# Patient Record
Sex: FEMALE | Race: WHITE | NOT HISPANIC OR LATINO | Employment: PART TIME | URBAN - METROPOLITAN AREA
[De-identification: names, ages, dates, MRNs, and addresses within clinical notes are randomized per-mention and may not be internally consistent; named-entity substitution may affect disease eponyms.]

---

## 2019-04-11 ENCOUNTER — OFFICE VISIT (OUTPATIENT)
Dept: URGENT CARE | Facility: CLINIC | Age: 54
End: 2019-04-11
Payer: COMMERCIAL

## 2019-04-11 VITALS
HEIGHT: 62 IN | OXYGEN SATURATION: 97 % | SYSTOLIC BLOOD PRESSURE: 120 MMHG | WEIGHT: 150 LBS | RESPIRATION RATE: 18 BRPM | TEMPERATURE: 98.2 F | HEART RATE: 73 BPM | DIASTOLIC BLOOD PRESSURE: 80 MMHG | BODY MASS INDEX: 27.6 KG/M2

## 2019-04-11 DIAGNOSIS — J02.9 SORE THROAT: ICD-10-CM

## 2019-04-11 DIAGNOSIS — H65.91 RIGHT NON-SUPPURATIVE OTITIS MEDIA: Primary | ICD-10-CM

## 2019-04-11 LAB — S PYO AG THROAT QL: NEGATIVE

## 2019-04-11 PROCEDURE — 99213 OFFICE O/P EST LOW 20 MIN: CPT | Performed by: FAMILY MEDICINE

## 2019-04-11 RX ORDER — BENZONATATE 100 MG/1
100 CAPSULE ORAL 3 TIMES DAILY PRN
Qty: 20 CAPSULE | Refills: 0 | Status: SHIPPED | OUTPATIENT
Start: 2019-04-11 | End: 2019-12-09

## 2019-04-11 RX ORDER — CLARITHROMYCIN 500 MG/1
500 TABLET, COATED ORAL EVERY 12 HOURS SCHEDULED
Qty: 20 TABLET | Refills: 0 | Status: SHIPPED | OUTPATIENT
Start: 2019-04-11 | End: 2019-04-21

## 2019-12-02 ENCOUNTER — APPOINTMENT (EMERGENCY)
Dept: RADIOLOGY | Facility: HOSPITAL | Age: 54
End: 2019-12-02
Payer: COMMERCIAL

## 2019-12-02 ENCOUNTER — HOSPITAL ENCOUNTER (OUTPATIENT)
Facility: HOSPITAL | Age: 54
Setting detail: OBSERVATION
Discharge: HOME | End: 2019-12-02
Attending: EMERGENCY MEDICINE | Admitting: INTERNAL MEDICINE
Payer: COMMERCIAL

## 2019-12-02 ENCOUNTER — APPOINTMENT (OUTPATIENT)
Dept: MRI IMAGING | Facility: HOSPITAL | Age: 54
End: 2019-12-02
Payer: COMMERCIAL

## 2019-12-02 ENCOUNTER — APPOINTMENT (OUTPATIENT)
Dept: NON INVASIVE DIAGNOSTICS | Facility: HOSPITAL | Age: 54
End: 2019-12-02
Payer: COMMERCIAL

## 2019-12-02 ENCOUNTER — APPOINTMENT (EMERGENCY)
Dept: CT IMAGING | Facility: HOSPITAL | Age: 54
End: 2019-12-02
Payer: COMMERCIAL

## 2019-12-02 VITALS
TEMPERATURE: 97.8 F | OXYGEN SATURATION: 96 % | BODY MASS INDEX: 26.78 KG/M2 | SYSTOLIC BLOOD PRESSURE: 173 MMHG | WEIGHT: 145.5 LBS | RESPIRATION RATE: 18 BRPM | HEART RATE: 65 BPM | HEIGHT: 62 IN | DIASTOLIC BLOOD PRESSURE: 110 MMHG

## 2019-12-02 DIAGNOSIS — R41.82 ALTERED MENTAL STATUS: Primary | ICD-10-CM

## 2019-12-02 LAB
ABO GROUP BLD: NORMAL
AMPHETAMINES SERPL QL SCN: NEGATIVE
ANION GAP BLD CALC-SCNC: 14 MMOL/L (ref 4–13)
ANION GAP SERPL CALCULATED.3IONS-SCNC: 11 MMOL/L (ref 4–13)
ANION GAP SERPL CALCULATED.3IONS-SCNC: 9 MMOL/L (ref 4–13)
APTT PPP: 28 SECONDS (ref 23–37)
BARBITURATES UR QL: NEGATIVE
BASOPHILS # BLD AUTO: 0.05 THOUSANDS/ΜL (ref 0–0.1)
BASOPHILS NFR BLD AUTO: 0 % (ref 0–1)
BENZODIAZ UR QL: NEGATIVE
BILIRUB UR QL STRIP: NEGATIVE
BLD GP AB SCN SERPL QL: NEGATIVE
BUN BLD-MCNC: 12 MG/DL (ref 5–25)
BUN SERPL-MCNC: 11 MG/DL (ref 5–25)
BUN SERPL-MCNC: 13 MG/DL (ref 5–25)
CA-I BLD-SCNC: 1.12 MMOL/L (ref 1.12–1.32)
CALCIUM SERPL-MCNC: 8.7 MG/DL (ref 8.3–10.1)
CALCIUM SERPL-MCNC: 8.7 MG/DL (ref 8.3–10.1)
CHLORIDE BLD-SCNC: 107 MMOL/L (ref 100–108)
CHLORIDE SERPL-SCNC: 106 MMOL/L (ref 100–108)
CHLORIDE SERPL-SCNC: 107 MMOL/L (ref 100–108)
CHOLEST SERPL-MCNC: 170 MG/DL (ref 50–200)
CLARITY UR: CLEAR
CO2 SERPL-SCNC: 27 MMOL/L (ref 21–32)
CO2 SERPL-SCNC: 28 MMOL/L (ref 21–32)
COCAINE UR QL: NEGATIVE
COLOR UR: NORMAL
CREAT BLD-MCNC: 0.5 MG/DL (ref 0.6–1.3)
CREAT SERPL-MCNC: 0.63 MG/DL (ref 0.6–1.3)
CREAT SERPL-MCNC: 0.75 MG/DL (ref 0.6–1.3)
EOSINOPHIL # BLD AUTO: 0.07 THOUSAND/ΜL (ref 0–0.61)
EOSINOPHIL NFR BLD AUTO: 1 % (ref 0–6)
ERYTHROCYTE [DISTWIDTH] IN BLOOD BY AUTOMATED COUNT: 11.9 % (ref 11.6–15.1)
ERYTHROCYTE [DISTWIDTH] IN BLOOD BY AUTOMATED COUNT: 12.1 % (ref 11.6–15.1)
EST. AVERAGE GLUCOSE BLD GHB EST-MCNC: 134 MG/DL
GFR SERPL CREATININE-BSD FRML MDRD: 101 ML/MIN/1.73SQ M
GFR SERPL CREATININE-BSD FRML MDRD: 109 ML/MIN/1.73SQ M
GFR SERPL CREATININE-BSD FRML MDRD: 89 ML/MIN/1.73SQ M
GLUCOSE P FAST SERPL-MCNC: 84 MG/DL (ref 65–99)
GLUCOSE SERPL-MCNC: 100 MG/DL (ref 65–140)
GLUCOSE SERPL-MCNC: 106 MG/DL (ref 65–140)
GLUCOSE SERPL-MCNC: 84 MG/DL (ref 65–140)
GLUCOSE UR STRIP-MCNC: NEGATIVE MG/DL
HBA1C MFR BLD: 6.3 % (ref 4.2–6.3)
HCT VFR BLD AUTO: 42.8 % (ref 34.8–46.1)
HCT VFR BLD AUTO: 44.8 % (ref 34.8–46.1)
HCT VFR BLD CALC: 41 % (ref 34.8–46.1)
HDLC SERPL-MCNC: 49 MG/DL
HGB BLD-MCNC: 13.8 G/DL (ref 11.5–15.4)
HGB BLD-MCNC: 14.5 G/DL (ref 11.5–15.4)
HGB BLDA-MCNC: 13.9 G/DL (ref 11.5–15.4)
HGB UR QL STRIP.AUTO: NEGATIVE
IMM GRANULOCYTES # BLD AUTO: 0.04 THOUSAND/UL (ref 0–0.2)
IMM GRANULOCYTES NFR BLD AUTO: 0 % (ref 0–2)
INR PPP: 0.89 (ref 0.84–1.19)
KETONES UR STRIP-MCNC: NEGATIVE MG/DL
LDLC SERPL CALC-MCNC: 111 MG/DL (ref 0–100)
LEUKOCYTE ESTERASE UR QL STRIP: NEGATIVE
LYMPHOCYTES # BLD AUTO: 3.14 THOUSANDS/ΜL (ref 0.6–4.47)
LYMPHOCYTES NFR BLD AUTO: 26 % (ref 14–44)
MCH RBC QN AUTO: 29.5 PG (ref 26.8–34.3)
MCH RBC QN AUTO: 29.7 PG (ref 26.8–34.3)
MCHC RBC AUTO-ENTMCNC: 32.2 G/DL (ref 31.4–37.4)
MCHC RBC AUTO-ENTMCNC: 32.4 G/DL (ref 31.4–37.4)
MCV RBC AUTO: 91 FL (ref 82–98)
MCV RBC AUTO: 92 FL (ref 82–98)
METHADONE UR QL: NEGATIVE
MONOCYTES # BLD AUTO: 0.62 THOUSAND/ΜL (ref 0.17–1.22)
MONOCYTES NFR BLD AUTO: 5 % (ref 4–12)
NEUTROPHILS # BLD AUTO: 8.04 THOUSANDS/ΜL (ref 1.85–7.62)
NEUTS SEG NFR BLD AUTO: 68 % (ref 43–75)
NITRITE UR QL STRIP: NEGATIVE
NRBC BLD AUTO-RTO: 0 /100 WBCS
OPIATES UR QL SCN: NEGATIVE
PCO2 BLD: 26 MMOL/L (ref 21–32)
PCP UR QL: NEGATIVE
PH UR STRIP.AUTO: 7 [PH]
PLATELET # BLD AUTO: 292 THOUSANDS/UL (ref 149–390)
PLATELET # BLD AUTO: 321 THOUSANDS/UL (ref 149–390)
PMV BLD AUTO: 10.2 FL (ref 8.9–12.7)
PMV BLD AUTO: 9.6 FL (ref 8.9–12.7)
POTASSIUM BLD-SCNC: 3.6 MMOL/L (ref 3.5–5.3)
POTASSIUM SERPL-SCNC: 3.3 MMOL/L (ref 3.5–5.3)
POTASSIUM SERPL-SCNC: 3.7 MMOL/L (ref 3.5–5.3)
PROT UR STRIP-MCNC: NEGATIVE MG/DL
PROTHROMBIN TIME: 12 SECONDS (ref 11.6–14.5)
RBC # BLD AUTO: 4.65 MILLION/UL (ref 3.81–5.12)
RBC # BLD AUTO: 4.91 MILLION/UL (ref 3.81–5.12)
RH BLD: POSITIVE
SODIUM BLD-SCNC: 143 MMOL/L (ref 136–145)
SODIUM SERPL-SCNC: 143 MMOL/L (ref 136–145)
SODIUM SERPL-SCNC: 145 MMOL/L (ref 136–145)
SP GR UR STRIP.AUTO: <=1.005 (ref 1–1.03)
SPECIMEN EXPIRATION DATE: NORMAL
SPECIMEN SOURCE: ABNORMAL
THC UR QL: NEGATIVE
TRIGL SERPL-MCNC: 52 MG/DL
TROPONIN I SERPL-MCNC: <0.02 NG/ML
UROBILINOGEN UR QL STRIP.AUTO: 0.2 E.U./DL
WBC # BLD AUTO: 11.96 THOUSAND/UL (ref 4.31–10.16)
WBC # BLD AUTO: 12.72 THOUSAND/UL (ref 4.31–10.16)

## 2019-12-02 PROCEDURE — 80048 BASIC METABOLIC PNL TOTAL CA: CPT | Performed by: INTERNAL MEDICINE

## 2019-12-02 PROCEDURE — 80047 BASIC METABLC PNL IONIZED CA: CPT

## 2019-12-02 PROCEDURE — G8978 MOBILITY CURRENT STATUS: HCPCS

## 2019-12-02 PROCEDURE — 80048 BASIC METABOLIC PNL TOTAL CA: CPT | Performed by: PHYSICIAN ASSISTANT

## 2019-12-02 PROCEDURE — 70551 MRI BRAIN STEM W/O DYE: CPT

## 2019-12-02 PROCEDURE — G8980 MOBILITY D/C STATUS: HCPCS

## 2019-12-02 PROCEDURE — 86850 RBC ANTIBODY SCREEN: CPT | Performed by: PHYSICIAN ASSISTANT

## 2019-12-02 PROCEDURE — 85610 PROTHROMBIN TIME: CPT | Performed by: PHYSICIAN ASSISTANT

## 2019-12-02 PROCEDURE — 99217 PR OBSERVATION CARE DISCHARGE MANAGEMENT: CPT | Performed by: INTERNAL MEDICINE

## 2019-12-02 PROCEDURE — 85730 THROMBOPLASTIN TIME PARTIAL: CPT | Performed by: PHYSICIAN ASSISTANT

## 2019-12-02 PROCEDURE — 85014 HEMATOCRIT: CPT

## 2019-12-02 PROCEDURE — G8979 MOBILITY GOAL STATUS: HCPCS

## 2019-12-02 PROCEDURE — 86901 BLOOD TYPING SEROLOGIC RH(D): CPT | Performed by: PHYSICIAN ASSISTANT

## 2019-12-02 PROCEDURE — 86900 BLOOD TYPING SEROLOGIC ABO: CPT | Performed by: PHYSICIAN ASSISTANT

## 2019-12-02 PROCEDURE — NC001 PR NO CHARGE: Performed by: PHYSICIAN ASSISTANT

## 2019-12-02 PROCEDURE — 97161 PT EVAL LOW COMPLEX 20 MIN: CPT

## 2019-12-02 PROCEDURE — 83036 HEMOGLOBIN GLYCOSYLATED A1C: CPT | Performed by: INTERNAL MEDICINE

## 2019-12-02 PROCEDURE — 99284 EMERGENCY DEPT VISIT MOD MDM: CPT | Performed by: PHYSICIAN ASSISTANT

## 2019-12-02 PROCEDURE — 84484 ASSAY OF TROPONIN QUANT: CPT | Performed by: PHYSICIAN ASSISTANT

## 2019-12-02 PROCEDURE — 85025 COMPLETE CBC W/AUTO DIFF WBC: CPT | Performed by: INTERNAL MEDICINE

## 2019-12-02 PROCEDURE — 71045 X-RAY EXAM CHEST 1 VIEW: CPT

## 2019-12-02 PROCEDURE — 85027 COMPLETE CBC AUTOMATED: CPT | Performed by: PHYSICIAN ASSISTANT

## 2019-12-02 PROCEDURE — 80061 LIPID PANEL: CPT | Performed by: INTERNAL MEDICINE

## 2019-12-02 PROCEDURE — 80307 DRUG TEST PRSMV CHEM ANLYZR: CPT | Performed by: PHYSICIAN ASSISTANT

## 2019-12-02 PROCEDURE — 99220 PR INITIAL OBSERVATION CARE/DAY 70 MINUTES: CPT | Performed by: INTERNAL MEDICINE

## 2019-12-02 PROCEDURE — 70498 CT ANGIOGRAPHY NECK: CPT

## 2019-12-02 PROCEDURE — 36415 COLL VENOUS BLD VENIPUNCTURE: CPT | Performed by: PHYSICIAN ASSISTANT

## 2019-12-02 PROCEDURE — 81003 URINALYSIS AUTO W/O SCOPE: CPT | Performed by: PHYSICIAN ASSISTANT

## 2019-12-02 PROCEDURE — 70496 CT ANGIOGRAPHY HEAD: CPT

## 2019-12-02 PROCEDURE — 99244 OFF/OP CNSLTJ NEW/EST MOD 40: CPT | Performed by: PSYCHIATRY & NEUROLOGY

## 2019-12-02 PROCEDURE — 99285 EMERGENCY DEPT VISIT HI MDM: CPT

## 2019-12-02 RX ORDER — ATORVASTATIN CALCIUM 10 MG/1
10 TABLET, FILM COATED ORAL DAILY
COMMUNITY
End: 2020-08-17 | Stop reason: SDUPTHER

## 2019-12-02 RX ORDER — ASPIRIN 325 MG
325 TABLET ORAL ONCE
Status: COMPLETED | OUTPATIENT
Start: 2019-12-02 | End: 2019-12-02

## 2019-12-02 RX ORDER — ATORVASTATIN CALCIUM 40 MG/1
40 TABLET, FILM COATED ORAL EVERY EVENING
Status: DISCONTINUED | OUTPATIENT
Start: 2019-12-02 | End: 2019-12-02 | Stop reason: HOSPADM

## 2019-12-02 RX ORDER — ASPIRIN 81 MG/1
81 TABLET, CHEWABLE ORAL DAILY
Status: DISCONTINUED | OUTPATIENT
Start: 2019-12-02 | End: 2019-12-02 | Stop reason: HOSPADM

## 2019-12-02 RX ADMIN — ASPIRIN 81 MG 81 MG: 81 TABLET ORAL at 09:09

## 2019-12-02 RX ADMIN — IODIXANOL 100 ML: 320 INJECTION, SOLUTION INTRAVASCULAR at 02:54

## 2019-12-02 RX ADMIN — ASPIRIN 325 MG ORAL TABLET 325 MG: 325 PILL ORAL at 03:47

## 2019-12-02 NOTE — DISCHARGE SUMMARY
Discharge Summary - [facility] Internal Medicine    Patient Information: Tamar Gomez 62 y o  female MRN: [id number]  Unit/Bed#: -01 Encounter: [id number]    Discharging Physician / Practitioner: Nu Smith MD  PCP: No primary care provider on file  Admission Date: 12/2/2019  Discharge Date: 12/02/19    Disposition:     Home     Reason for Admission: Altered mental status    Discharge Diagnoses:     Principal Problem:    Altered mental status  Resolved Problems:    * No resolved hospital problems  *      Consultations During Hospital Stay:  · neurology    Procedures Performed:     ·     Significant Findings / Test Results:     · MRI brain w/o contrast- negative for stroke  ·     Incidental Findings:   ·      Test Results Pending at Discharge (will require follow up):   ·      Outpatient Tests Requested:  · EEG    Complications:  None    History of Present Illness:     Tamar Gomez is a 62 y o  female with with a past medical history significant for VSD and hyperlipidemia who presents with a complaint of altered mental status from home  Patient was in her normal state of health until today when she developed confusion and forgetfulness approximately around midnight with repeated questions about where she was and what was going on  She was able to recognize her  but otherwise she had no recent memories of events  The patient has recently undergone trauma with her son-in-law having committed suicide leaving her daughter a  with small children that they were watching over the weekend  Patient herself initially complained of a headache which has since resolved, denies head trauma denies nausea vomiting focal numbness or weakness chest pain shortness of breath fevers chills neck stiffness cough    She reports she is beginning to recall some events from earlier in the day that she had previously forgotten    Hospital Course:     Tamar Gomez is a 62 y o  female patient who originally presented to the hospital on 12/2/2019 due to acute toxic metabolic encephalopathy  Patient was evaluated by neurology and thought that her transient global amnesia is secondary to conversion disorder due to recent stressors in her life in which her son in law committed suicide  It was also the first time she dropped off there grandkids and were away from them  Patient had a MRI of the brainwhich was negative for stroke  Her encephalopathy has resolved  She is hemodynamically stable and will be discharged home  Patient for EEG as outpatient  She is to followup with her PCP for the results  Condition at Discharge: stable     Discharge Day Visit / Exam:     * Please refer to separate progress note for these details *    Discussion with Family:         Discharge instructions/Information to patient and family:   See after visit summary for information provided to patient and family  Provisions for Follow-Up Care:  See after visit summary for information related to follow-up care and any pertinent home health orders  Planned Readmission: Patient seen and examined at bedside  Patient has no new complaints  Discharge Statement:  I spent 50 minutes discharging the patient  This time was spent on the day of discharge  I had direct contact with the patient on the day of discharge  Greater than 50% of the total time was spent examining patient, answering all patient questions, arranging and discussing plan of care with patient as well as directly providing post-discharge instructions  Additional time then spent on discharge activities  Discharge Medications:  See after visit summary for reconciled discharge medications provided to patient and family  ** Please Note: This note has been constructed using a voice recognition system   **

## 2019-12-02 NOTE — ED NOTES
1  CC- Altered Mental Status    2  Orientation status- A&O x3, disoriented to situation, long/short term memory loss    3  Abnormal labs/ focused assessment/ vitals- see results  4  Medication/drips- none    5  Narcotic time/ pain medication- aspirin    6  IV lines/drain/etc  20g LAC     7  Isolation status- None     8  Skin- Intact  9  Ambulation status- Steady    10   Phone number- [phone], HERVE  12/02/19 [id number]

## 2019-12-02 NOTE — OCCUPATIONAL THERAPY NOTE
Occupational Therapy Screen Note        Patient Name: Shalom Ingram  CLQMX'F Date: 12/2/2019    OT orders received  Chart review completed  Spoke with DIPAK Torres and RN Antonia Shirley who report no functional or cognitive concerns prior to d/c home  No OT intervention warranted at this time  Please reconsult if further needs arise  D/C OT      Neeru Pike, OTR/L

## 2019-12-02 NOTE — SOCIAL WORK
LOS OBS GMLOS none  CM met with patient and -Aundrea  Patient lives with her  in a three story house  There are ten stairs to enter from outside  Patient is independent with ADLs  Patient does not have rehab and hhc hx  Patient fills scripts with [facility], effort  Patient denies mental health and substance abuse hx  Patient states her -anudrea  Patient is employed part-time  Patient drives  Patient's pcp is in [place]  Patient would like to have a PCP scheduled for the [facility]  CM scheduled patient's PCP appt for Monday, 12/9/19 at [facility]     will transport patient home today  Nurse and SLIM informed  CM reviewed discharge planning process including the following: identifying help at home, patient preference for discharge planning needs, pharmacy preference, and availability of treatment team to discuss questions or concerns patient and/or family may have regarding understanding medications and recognizing signs and symptoms once discharged  CM also encouraged patient to follow up with all recommended appointments after discharge  Patient advised of importance for patient and family to participate in managing patients medical well being  CM name and role reviewed  Discharge Checklist reviewed and CM will continue to monitor for progress toward discharge goals in nursing and provider rounds

## 2019-12-02 NOTE — SPEECH THERAPY NOTE
Consult received  Records reviewed  Pt admitted c TGA  CT -, MRI pending  Pt passed RN Dysphagia Assessment  Communication deficits denied  NIH score 0  Sxs reported to be transient/no persistent deficits  No additional inpatient Speech Pathology evaluation appears indicated at this time  Please re-consult if additional concerns arise  Thank you

## 2019-12-02 NOTE — H&P
H&P- Sinan Li 1965, 62 y o  female MRN: [id number]    Unit/Bed#: -01 Encounter: [id number]    Primary Care Provider: No primary care provider on file  Date and time admitted to hospital: 12/2/2019  2:14 AM        * Altered mental status  Assessment & Plan  TGA versus CVA  Telemetry monitoring  Aspirin given in the emergency department  Neurology consultation  Lipitor 40 mg now  Check fasting lipid profile  Stroke protocol  Neuro checks  Echocardiogram  MRI brain  Fall precautions  Supportive care      VTE Prophylaxis: Low VTE risk  / reason for no mechanical VTE prophylaxis Low VTE risk   Code Status:  Full code  POLST: There is no POLST form on file for this patient (pre-hospital)  Discussion with family:  At bedside    Anticipated Length of Stay:  Patient will be admitted on an Observation basis with an anticipated length of stay of  less than 2 midnights  Justification for Hospital Stay:  Evaluate for CVA in the setting of altered mental status    Total Time for Visit, including Counseling / Coordination of Care: 30 minutes  Greater than 50% of this total time spent on direct patient counseling and coordination of care  Chief Complaint:   Altered mental status    History of Present Illness:    Sinan Li is a 62 y o  female with with a past medical history significant for VSD and hyperlipidemia who presents with a complaint of altered mental status from home  Patient was in her normal state of health until today when she developed confusion and forgetfulness approximately around midnight with repeated questions about where she was and what was going on  She was able to recognize her  but otherwise she had no recent memories of events  The patient has recently undergone trauma with her son-in-law having committed suicide leaving her daughter a  with small children that they were watching over the weekend      Patient herself initially complained of a headache which has since resolved, denies head trauma denies nausea vomiting focal numbness or weakness chest pain shortness of breath fevers chills neck stiffness cough  She reports she is beginning to recall some events from earlier in the day that she had previously forgotten  Review of Systems:    Review of Systems   Constitutional: Positive for fatigue  Negative for activity change, appetite change, chills, diaphoresis, fever and unexpected weight change  HENT: Negative  Negative for congestion, hearing loss, rhinorrhea, sinus pressure, sinus pain, sneezing, sore throat, tinnitus and trouble swallowing  Eyes: Negative  Negative for photophobia, pain, discharge and visual disturbance  Respiratory: Negative  Negative for cough, chest tightness, shortness of breath, wheezing and stridor  Cardiovascular: Negative  Negative for chest pain, palpitations and leg swelling  Gastrointestinal: Negative  Negative for abdominal distention, abdominal pain, constipation, diarrhea, nausea and vomiting  Endocrine: Negative  Negative for cold intolerance, heat intolerance and polyuria  Genitourinary: Negative  Negative for difficulty urinating, dysuria, flank pain, frequency, hematuria and urgency  Musculoskeletal: Negative  Negative for arthralgias, gait problem, joint swelling, myalgias and neck stiffness  Skin: Negative  Negative for color change, pallor, rash and wound  Allergic/Immunologic: Negative  Negative for immunocompromised state  Neurological: Positive for speech difficulty ( patient had some slurring of her speech/dysarthria now resolved) and headaches (Now resolved)  Negative for dizziness, tremors, seizures, syncope, facial asymmetry, weakness, light-headedness and numbness  Hematological: Negative  Negative for adenopathy  Does not bruise/bleed easily  Psychiatric/Behavioral: Positive for confusion and decreased concentration   Negative for agitation, behavioral problems, dysphoric mood, hallucinations, self-injury and sleep disturbance  The patient is not nervous/anxious and is not hyperactive  Past Medical and Surgical History:     Past Medical History:   Diagnosis Date    Hyperlipidemia        No past surgical history on file  Meds/Allergies:    Prior to Admission medications    Medication Sig Start Date End Date Taking? Authorizing Provider   atorvastatin (LIPITOR) 10 mg tablet Take 10 mg by mouth daily   Yes Historical Provider, MD   benzonatate (TESSALON PERLES) 100 mg capsule Take 1 capsule (100 mg total) by mouth 3 (three) times a day as needed for cough  Patient not taking: Reported on 12/2/2019 4/11/19   Eze Severino DO     I have reviewed home medications using allscripts  Allergies: No Known Allergies    Social History:     Marital Status: /Civil Union   Occupation:    Patient Pre-hospital Living Situation:  Independent  Patient Pre-hospital Level of Mobility:  Ambulates without assist device  Patient Pre-hospital Diet Restrictions:  None  Substance Use History:   Social History     Substance and Sexual Activity   Alcohol Use Not Currently     Social History     Tobacco Use   Smoking Status Never Smoker   Smokeless Tobacco Never Used     Social History     Substance and Sexual Activity   Drug Use Never       Family History:    non-contributory    Physical Exam:     Vitals:   Blood Pressure: 141/85 (12/02/19 0458)  Pulse: 75 (12/02/19 0440)  Temperature: 98 4 °F (36 9 °C) (12/02/19 0458)  Temp Source: Oral (12/02/19 0440)  Respirations: 17 (12/02/19 0440)  Height: 5' 2" (157 5 cm) (12/02/19 0442)  Weight - Scale: 66 kg (145 lb 8 1 oz) (12/02/19 0442)  SpO2: 98 % (12/02/19 0440)    Physical Exam   Constitutional: She is oriented to person, place, and time  She appears well-developed and well-nourished  No distress  HENT:   Head: Normocephalic and atraumatic     Right Ear: External ear normal    Left Ear: External ear normal    Nose: Nose normal  Mouth/Throat: Oropharynx is clear and moist  No oropharyngeal exudate  Eyes: Pupils are equal, round, and reactive to light  Conjunctivae and EOM are normal  Right eye exhibits no discharge  Left eye exhibits no discharge  No scleral icterus  Neck: Normal range of motion  Neck supple  No JVD present  No tracheal deviation present  No thyromegaly present  Cardiovascular: Normal rate, regular rhythm and intact distal pulses  Exam reveals no gallop and no friction rub  Murmur heard  Pulmonary/Chest: Effort normal and breath sounds normal  No stridor  No respiratory distress  She has no wheezes  She has no rales  Abdominal: Soft  Bowel sounds are normal  She exhibits no distension  There is no tenderness  There is no rebound and no guarding  Musculoskeletal: Normal range of motion  She exhibits no edema, tenderness or deformity  Neurological: She is alert and oriented to person, place, and time  She has normal reflexes  No cranial nerve deficit  She exhibits normal muscle tone  Coordination normal    Patient is currently a plus O x3 and is having some improved recall of events from earlier in the day  Strength 5/5 throughout sensation intact throughout   Skin: Skin is warm and dry  No rash noted  She is not diaphoretic  No erythema  No pallor  Psychiatric: She has a normal mood and affect  Her behavior is normal  Judgment and thought content normal    Nursing note and vitals reviewed  Additional Data:     Lab Results: I have personally reviewed pertinent reports        Results from last 7 days   Lab Units 12/02/19  0246 12/02/19 0237   WBC Thousand/uL  --  12 72*   HEMOGLOBIN g/dL  --  14 5   I STAT HEMOGLOBIN g/dl 13 9  --    HEMATOCRIT %  --  44 8   HEMATOCRIT, ISTAT % 41  --    PLATELETS Thousands/uL  --  321     Results from last 7 days   Lab Units 12/02/19  0246 12/02/19  0237   SODIUM mmol/L  --  145   POTASSIUM mmol/L  --  3 3*   CHLORIDE mmol/L  --  106   CO2 mmol/L  --  28   CO2, I-STAT mmol/L 26  --    BUN mg/dL  --  13   CREATININE mg/dL  --  0 75   AGAP mmol/L 14*  --    ANION GAP mmol/L  --  11   CALCIUM mg/dL  --  8 7   GLUCOSE RANDOM mg/dL  --  100     Results from last 7 days   Lab Units 12/02/19  0237   INR  0 89                   Imaging: I have personally reviewed pertinent reports  X-ray chest 1 view portable   Final Result by Elsie Chisholm DO (12/02 0309)      Lung volumes are low but no acute cardiopulmonary disease is seen  Workstation performed: [id number]         CTA stroke alert (head/neck)   Final Result by Jose Oakley MD (12/02 0326)      No acute intracranial abnormality  No focal stenosis or saccular aneurysm within the [place]  No hemodynamically significant stenosis within either common or internal carotid artery  Findings were directly discussed with Jose Enrique Germain on 12/2/2019 3:20 AM                      Workstation performed: [id number]         CT stroke alert brain   Final Result by Jose Oakley MD (12/02 0325)      Normal examination  Findings were directly discussed with Jose Enrique Germain on 12/2/2019 3:20 AM       Workstation performed: [id number]         MRI Inpatient Order    (Results Pending)       EKG, Pathology, and Other Studies Reviewed on Admission:   · EKG:      Allscripts / Epic Records Reviewed: Yes     ** Please Note: This note has been constructed using a voice recognition system   **

## 2019-12-02 NOTE — CONSULTS
Consultation - Neurology   Renae Daniel 62 y o  female MRN: [id number]  Unit/Bed#: -01 Encounter: [id number]      Physician Requesting Consult: Kendrick Mahajan MD  Consult to Neurology  Consult performed by: Taye Osman MD  Consult ordered by: Kendrick Mahajan MD        Reason for Consult / Principal Problem: ams      Assessment:  Conversion disorder with transient global amnesia presentation in setting of recent stressors  Although she also had a headache, it was her typical mild bifrontal tension headache that she gets with weather changes and not suggestive of a complex migraine  Recall from yesterday still minimal   No recent head trauma  No hx of seizures or family hx of seizures  cta head/neck with no evidence of aneurym/atherosclerosis  Mri brain unremarkable for acute/chronic cva, hemorrage and no visible edema  Plan:  Stable for discharge from neurological standpoint  I would still recommend outpt routine eeg and results can be sent to her pcp  No outpt neurology follow up needed  She and  state they will find a psychologist         HPI: Renae Daniel is a 62 y o  female who developed sudden confusion yesterday around midnight  She had a headache all day long but not different than usual mild tension headache with weather changes  Per  and patient they dropped off grandchildren yesterday  Yesterday was the first day in three weeks that patient was away from the grandchildren as either she was staying with them at her daughter's place and most recently for the past week they have been staying with her and   Her son in law hung himself last week   states she appeared to be grieving as any normal person would  Both  and patient maintain no underlying psychiatric diagnosis      Per  they were watching a football game last night around 11 pm and after an hour sleep or so she woke up and they had sexual intercourse and right after she began asking a bunch of questions repetitively such as where are the grand children, and what happened to 's arm, he had recent poison ivy contact  Also she didn't remember anything of what they did that day  He mentions that she was looking forward to getting new blinds but after dropping the grandchildren off they went to get the blinds but she was not excited at all which the  thought was strange  He also mentioned she had to urinate 4 times in 25 mins at the beginning of this confusional period which is abnormal for her as she only gets up once or twice a night to urinate  Patient herself states she remembers having a mild headache, dropping the grandchildren off around 1 pm and getting back home at 3 pm and speaking to her parents at 5 pm   She says she remembers listening to the [facility] on the radio and knows they won but doesn't remember who they played and remembers watching the [employer] but not remembering who they played  No prior such episodes  Her son in law hung himself yesterday a week ago but grieving appeared to be normal  Her daughter is now taking care of two young children by herself  ROS:  Per 12 point review currently fatigued, mild bifrontal HA since mri, rest negative  Historical Information   Past Medical History:   Diagnosis Date    Hyperlipidemia      No past surgical history on file  Social History   Social History     Tobacco Use   Smoking Status Never Smoker   Smokeless Tobacco Never Used     Social History     Substance and Sexual Activity   Alcohol Use Not Currently     Social History     Substance and Sexual Activity   Drug Use Never       Family History: non-contributory      Meds/Allergies     No Known Allergies    all current active meds have been reviewed    Objective   Vitals:Blood pressure 134/76, pulse 68, temperature 97 9 °F (36 6 °C), temperature source Oral, resp  rate 16, height 5' 2" (1 575 m), weight 66 kg (145 lb 8 1 oz), SpO2 96 %  ,Body mass index is 26 61 kg/m²  No intake or output data in the 24 hours ending 12/02/19 0757        Physical Exam:   Physical Exam General appearance: alert, appears stated age and cooperative  Head: Normocephalic, without obvious abnormality, atraumatic  Lungs: clear to auscultation bilaterally  Heart: regular rate and rhythm, systolic murmor noted  Neurologic:  Cognitive:  Mental status: Alert, orientedX3, thought content appropriate  Insight, attention, concentration intact  No expressive/receptive aphasia  3/3 immediate and delayed recall  CN: CNII-XII normal  Fundoscopy wnl  Motor: normal tone and bulk  5 power ue/le bilat  Sensory: light touch, proprioception, vibration intact throughout  Cerebellar: finger to nose, heel to shin no dysmetria or ataxia  DTR's: 2 ue/le bilat  Plantars: rt big toe downgoing, left big toe mute         Lab Results: I have personally reviewed pertinent reports  Imaging Studies: I have personally reviewed pertinent films in PACS    EKG, Pathology, and Other Studies: I have personally reviewed pertinent films in PACS

## 2019-12-02 NOTE — CONSULTS
Duplicate order, please refer to neurology consult note performed by Dr Joaquin Aguayo today, 12/02/2019

## 2019-12-02 NOTE — PHYSICAL THERAPY NOTE
Physical Therapy Evaluation     Patient's Name: Shalom Ingram    Admitting Diagnosis  Altered mental status [R41 82]  Altered mental state [R41 82]    Problem List  Patient Active Problem List   Diagnosis    Altered mental status       Past Medical History  Past Medical History:   Diagnosis Date    Hyperlipidemia        Past Surgical History  No past surgical history on file  19 0850   Note Type   Note type Eval only   Pain Assessment   Pain Assessment 0-10   Pain Score No Pain   Home Living   Type of Home House   Home Layout Multi-level;Bed/bath upstairs  (0 PAULA)   Bathroom Shower/Tub Tub/shower unit   Bathroom Toilet Standard   Bathroom Equipment Other (Comment)  (no DME per pt)   [address] Other (Comment)  (no AD at baseline per pt)   Prior Function   Level of [place] Independent with ADLs and functional mobility   Lives With Spouse   Receives Help From Family   ADL Assistance Independent   IADLs Independent   Falls in the last 6 months 0   Vocational Part time employment  ()   Comments driving to work, wears glasses   Restrictions/Precautions   [place] Bearing Precautions Per Order No   Braces or Orthoses Other (Comment)  (none per pt)   Other Precautions Telemetry   General   Additional Pertinent History Pt agreeable to PT evaluation today  Pt has no memory of arriving to hospital  Reports "feeling good now"   Family/Caregiver Present Yes   Cognition   Overall Cognitive Status [facility]   Arousal/Participation Alert   Orientation Level Oriented X4   Memory Within functional limits   Following Commands Follows all commands and directions without difficulty   Comments Two patient identifiers assessed   Pt able to provide full name and    RUE Assessment   RUE Assessment WFL   RUE Strength   RUE Overall Strength Within Functional Limits - strength 5/5   LUE Assessment   LUE Assessment WFL   LUE Strength   LUE Overall Strength Within Functional Limits - strength 5/5   RLE Assessment   RLE Assessment WFL   Strength RLE   RLE Overall Strength 5/5   LLE Assessment   LLE Assessment WFL   Strength LLE   LLE Overall Strength 5/5   Coordination   Movements are Fluid and Coordinated 1   Sensation WFL   Light Touch   RLE Light Touch Grossly intact   LLE Light Touch Grossly intact   Bed Mobility   Additional Comments pt observed standing at bedside upon arrival   Transfers   Sit to Stand 7  Independent   Stand to Sit 7  Independent   Ambulation/Elevation   Gait pattern WNL   Gait Assistance 7  Independent   Assistive Device None   Distance 180' x2   Stair Management Assistance 7  Independent   Stair Management Technique No rails; Alternating pattern   Number of Stairs 13   Balance   Static Sitting Normal   Dynamic Sitting Normal   Static Standing Normal   Dynamic Standing Normal   Ambulatory Normal   Endurance Deficit   Endurance Deficit No   Activity Tolerance   Activity Tolerance Patient tolerated treatment well   Medical Staff Made Aware PT Melissa   Nurse Made Aware pt deemed appropriate for PT evaluation today by RN Soco Zhu  Pt returned to EOB at end of PT session with all needs in reach  Assessment   Prognosis Excellent   Assessment Pt is 62 y o  female seen for PT evaluation s/p admit to [facility] on 12/2/2019 w/ Altered mental status  PT consulted to assess pt's functional mobility and d/c needs  Order placed for PT eval and tx, w/ up w/ A order  Comorbidities affecting pt's physical performance at time of assessment include: hyperlipidemia  PTA, pt was independent w/ all functional mobility w/ ADLs, IADLs, work tasks, and driving, ambulates unrestricted distances and all terrain, has 0 PAULA, lives w/  in multi-level house and works part time  Personal factors affecting pt at time of IE include: lives in [facility]  Please find objective findings from PT assessment regarding body systems outlined above   The following objective measures performed on IE: Barthel Index: 100/100 and Modified Jack: 0 (no symptoms)  Pt's clinical presentation is currently stable  From PT/mobility standpoint, pt appears to be functioning close to or at mobility baseline, therefore no further immediate skilled PT needs are warranted at this time  Pt currently performing all phases of functional mobility at independent level without need for AD  Recommend pt continue to mobilize ad julius while here  Recommend pt return to previous living environment once medically cleared  Will sign off, D/C PT  Please reconsult if further immediate skilled PT needs are warranted     Barriers to Discharge None   Goals   Patient Goals "to get out of here"   PT Treatment Day 0   Plan   Treatment/Interventions Spoke to nursing;Family;Spoke to case management   Recommendation   Recommendation D/C PT;Home with family support   PT - OK to Discharge Yes  (when medically cleared)   Modified Jack Scale   Modified Jack Scale 0   Barthel Index   Feeding 10   Bathing 5   Grooming Score 5   Dressing Score 10   Bladder Score 10   Bowels Score 10   Toilet Use Score 10   Transfers (Bed/Chair) Score 15   Mobility (Level Surface) Score 15   Stairs Score 10   Barthel Index Score 100       [employer], SPT

## 2019-12-02 NOTE — PLAN OF CARE
Problem: Neurological Deficit  Goal: Neurological status is stable or improving  Description  Interventions:  - Monitor and assess patient's level of consciousness, motor function, sensory function, and level of assistance needed for ADLs  - Monitor and report changes from baseline  Collaborate with interdisciplinary team to initiate plan and implement interventions as ordered  - Provide and maintain a safe environment  - Consider seizure precautions  - Consider fall precautions  - Consider aspiration precautions  - Consider bleeding precautions  Outcome: Progressing     Problem: Activity Intolerance/Impaired Mobility  Goal: Mobility/activity is maintained at optimum level for patient  Description  Interventions:  - Assess and monitor patient  barriers to mobility and need for assistive/adaptive devices  - Assess patient's emotional response to limitations  - Collaborate with interdisciplinary team and initiate plans and interventions as ordered  - Encourage independent activity per ability   - Maintain proper body alignment  - Perform active/passive rom as tolerated/ordered  - Plan activities to conserve energy   - Turn patient as appropriate  Outcome: Progressing     Problem: Communication Impairment  Goal: Ability to express needs and understand communication  Description  Assess patient's communication skills and ability to understand information  Patient will demonstrate use of effective communication techniques, alternative methods of communication and understanding even if not able to speak  - Encourage communication and provide alternate methods of communication as needed  - Collaborate with case management/ for discharge needs  - Include patient/family/caregiver in decisions related to communication    Outcome: Progressing     Problem: Potential for Aspiration  Goal: Non-ventilated patient's risk of aspiration is minimized  Description  Assess and monitor vital signs, respiratory status, and labs (WBC)  Monitor for signs of aspiration (tachypnea, cough, rales, wheezing, cyanosis, fever)  - Assess and monitor patient's ability to swallow  - Place patient up in chair to eat if possible  - HOB up at 90 degrees to eat if unable to get patient up into chair   - Supervise patient during oral intake  - Instruct patient/ family to take small bites  - Instruct patient/ family to take small single sips when taking liquids  - Follow patient-specific strategies generated by speech pathologist   Outcome: Progressing  Goal: Ventilated patient's risk of aspiration is minimized  Description  Assess and monitor vital signs, respiratory status, airway cuff pressure, and labs (WBC)  Monitor for signs of aspiration (tachypnea, cough, rales, wheezing, cyanosis, fever)  - Elevate head of bed 30 degrees if patient has tube feeding   - Monitor tube feeding  Outcome: Progressing     Problem: Nutrition  Goal: Nutrition/Hydration status is improving  Description  Monitor and assess patient's nutrition/hydration status for malnutrition (ex- brittle hair, bruises, dry skin, pale skin and conjunctiva, muscle wasting, smooth red tongue, and disorientation)  Collaborate with interdisciplinary team and initiate plan and interventions as ordered  Monitor patient's weight and dietary intake as ordered or per policy  Utilize nutrition screening tool and intervene per policy  Determine patient's food preferences and provide high-protein, high-caloric foods as appropriate  - Assist patient with eating   - Allow adequate time for meals   - Encourage patient to take dietary supplement as ordered  - Collaborate with clinical nutritionist   - Include patient/family/caregiver in decisions related to nutrition    Outcome: Progressing

## 2019-12-02 NOTE — ED PROVIDER NOTES
History  Chief Complaint   Patient presents with    Altered Mental Status     Pt c/o altered mental status that started after they went to lay down  Per  " they made love and afterwards she wasn't making any sense " Pt currently forgetting  D D  is a 61 y/o female with PMH significant for VSD and hypercholesterolemia who presents to the emergency department via  for complaint of confusion beginning upon waking   states that the patient was complaining of a headache throughout the day yesterday but not worse than normal, was also inappropriately laughing at things that normally she would not laugh at  States he and the patient were watching a football game around approx  11pm when patient complained of fatigue worse than expected for her level of activity for the day and went to bed   states he is unsure if she went to bed right away and how long she was asleep, possibly 1 hour before waking, upon which he and the patient had sexual intercourse  States she went to the bathroom after intercourse, came back into the room, and was visibly confused, asking "what happened today, what did we do today"  He states she could not remember how old she was or how many years they are   States she urinated multiple times, which is unusual, also had one episode of bladder incontinence  Per , patient's son in law hung himself approx  1 week ago, patient did not find son in law but was contacted via telephone about death; per , she has been grieving normally  She denies drug use, head injury or fall, recent illness, recent travel, previous episodes of amnesia or confusion, fever, chills, lightheadedness/dizziness, syncope, chest pain, shortness of breath, palpitations, nausea, vomiting, abdominal pain, extremity weakness or numbness, facial numbness or droop, visual change or loss, diplopia, blurred vision, photosensitivity, neck or back pain   When asked if she currently has a headache, patient answered, "I don't know, I can't tell "  is a diabetic, states he took patient's sugar at home, reading was 99  Prior to Admission Medications   Prescriptions Last Dose Informant Patient Reported? Taking?   benzonatate (TESSALON PERLES) 100 mg capsule   No No   Sig: Take 1 capsule (100 mg total) by mouth 3 (three) times a day as needed for cough      Facility-Administered Medications: None       Past Medical History:   Diagnosis Date    Hyperlipidemia        No past surgical history on file  No family history on file  I have reviewed and agree with the history as documented  Social History     Tobacco Use    Smoking status: Never Smoker    Smokeless tobacco: Never Used   Substance Use Topics    Alcohol use: Not Currently    Drug use: Never        Review of Systems   Constitutional: Positive for activity change and fatigue  Negative for chills, diaphoresis, fever and unexpected weight change  HENT: Negative for congestion, dental problem, ear discharge, ear pain, facial swelling, hearing loss, mouth sores, postnasal drip, rhinorrhea, sinus pressure, sinus pain, sneezing, sore throat, tinnitus and trouble swallowing  Eyes: Negative for photophobia, pain, discharge, redness and visual disturbance  Respiratory: Negative for cough, choking, chest tightness, shortness of breath, wheezing and stridor  Cardiovascular: Negative for chest pain, palpitations and leg swelling  Gastrointestinal: Negative for abdominal distention, abdominal pain, constipation, diarrhea, nausea and vomiting  Genitourinary: Positive for frequency  Negative for decreased urine volume, difficulty urinating, dysuria, flank pain, hematuria and urgency  Musculoskeletal: Negative for arthralgias, back pain, gait problem, joint swelling, myalgias, neck pain and neck stiffness  Skin: Negative for color change, pallor, rash and wound     Neurological: Positive for speech difficulty and headaches  Negative for dizziness, tremors, seizures, syncope, facial asymmetry, weakness, light-headedness and numbness  Hematological: Negative for adenopathy  Psychiatric/Behavioral: Positive for confusion  Negative for agitation and hallucinations  All other systems reviewed and are negative  Physical Exam  Physical Exam   Constitutional: She is oriented to person, place, and time  She appears well-developed and well-nourished  She is cooperative  Non-toxic appearance  No distress  No facial droop or neglect  Lying in bed comfortably  Becomes tearful upon questioning, when she is unable to recall answers  Repeating multiple times "something bad happened, didn't it"  HENT:   Head: Normocephalic and atraumatic  Head is without abrasion, without contusion, without laceration, without right periorbital erythema and without left periorbital erythema  Mouth/Throat: Oropharynx is clear and moist    Eyes: Pupils are equal, round, and reactive to light  Conjunctivae, EOM and lids are normal  Right eye exhibits no chemosis  Left eye exhibits no chemosis  Neck: Normal range of motion and full passive range of motion without pain  Neck supple  Carotid bruit is not present  No neck rigidity  Cardiovascular: Normal rate, regular rhythm and intact distal pulses  Exam reveals no decreased pulses  Murmur heard  Pulmonary/Chest: Effort normal and breath sounds normal  No stridor  No tachypnea  No respiratory distress  She has no decreased breath sounds  She has no wheezes  She has no rhonchi  She exhibits no tenderness  Musculoskeletal: Normal range of motion  She exhibits no edema, tenderness or deformity  Lymphadenopathy:     She has no cervical adenopathy  Neurological: She is alert and oriented to person, place, and time  She has normal strength  She displays no tremor and normal reflexes  No cranial nerve deficit or sensory deficit  She displays a negative Romberg sign   She displays no seizure activity  Coordination and gait normal  GCS eye subscore is 4  GCS verbal subscore is 5  GCS motor subscore is 6  She displays no Babinski's sign on the right side  She displays no Babinski's sign on the left side  No nystagmus, dysphagia, diplopia, aphasia, vertigo, ataxia, visions loss  Normal finger to nose, gait, rapid alternating movement, strength and sensation in bilat upper and lower extremities  Normal upper and lower reflexes  No pronator drift  Normal heel to shin  EOMI, no visual field defects  CN 2-12 intact  Normal babinski  Naming objects normal, however, one episode of stuttering multiple times when asked to identify a stethoscope    Able to follow commands but with delay in cognitive processing, i e  Patient repeats command and then performs command    Skin: Skin is warm  Capillary refill takes less than 2 seconds  No abrasion, no bruising, no laceration, no lesion, no petechiae and no rash noted  She is not diaphoretic  No cyanosis or erythema  No pallor  Psychiatric: Her mood appears anxious  Vitals reviewed        Vital Signs  ED Triage Vitals   Temperature Pulse Respirations Blood Pressure SpO2   12/02/19 0307 12/02/19 0221 12/02/19 0221 12/02/19 0221 12/02/19 0221   97 9 °F (36 6 °C) 86 19 (!) 216/90 98 %      Temp Source Heart Rate Source Patient Position - Orthostatic VS BP Location FiO2 (%)   12/02/19 0307 12/02/19 0221 12/02/19 0221 12/02/19 0221 --   Oral Monitor Lying Right arm       Pain Score       12/02/19 0221       No Pain           Vitals:    12/02/19 0315 12/02/19 0330 12/02/19 0345 12/02/19 0400   BP: 152/75 160/85 146/70 144/68   Pulse: 83 80 80 79   Patient Position - Orthostatic VS: Lying Lying Lying Lying         Visual Acuity  Visual Acuity      Most Recent Value   L Pupil Size (mm)  3   R Pupil Size (mm)  3          ED Medications  Medications   iodixanol (VISIPAQUE) 320 MG/ML injection 100 mL (100 mL Intravenous Given 12/2/19 0254)   aspirin tablet 325 mg (325 mg Oral Given 12/2/19 0347)       Diagnostic Studies  Results Reviewed     Procedure Component Value Units Date/Time    Rapid drug screen, urine [[id number]]  (Normal) Collected:  12/02/19 0307    Lab Status:  Final result Specimen:  Urine, Clean Catch Updated:  12/02/19 0336     Amph/Meth UR Negative     Barbiturate Ur Negative     Benzodiazepine Urine Negative     Cocaine Urine Negative     Methadone Urine Negative     Opiate Urine Negative     PCP Ur Negative     THC Urine Negative    Narrative:       FOR MEDICAL PURPOSES ONLY  IF CONFIRMATION NEEDED PLEASE CONTACT THE LAB WITHIN 5 DAYS      Drug Screen Cutoff Levels:  AMPHETAMINE/METHAMPHETAMINES  1000 ng/mL  BARBITURATES     200 ng/mL  BENZODIAZEPINES     200 ng/mL  COCAINE      300 ng/mL  METHADONE      300 ng/mL  OPIATES      300 ng/mL  PHENCYCLIDINE     25 ng/mL  THC       50 ng/mL      UA w Reflex to Microscopic w Reflex to Culture [[id number]] Collected:  12/02/19 0307    Lab Status:  Final result Specimen:  Urine, Clean Catch Updated:  12/02/19 0316     Color, UA Light Yellow     Clarity, UA Clear     Specific Gravity, UA <=1 005     pH, UA 7 0     Leukocytes, UA Negative     Nitrite, UA Negative     Protein, UA Negative mg/dl      Glucose, UA Negative mg/dl      Ketones, UA Negative mg/dl      Urobilinogen, UA 0 2 E U /dl      Bilirubin, UA Negative     Blood, UA Negative    Troponin I [[id number]]  (Normal) Collected:  12/02/19 0237    Lab Status:  Final result Specimen:  Blood from Arm, Left Updated:  12/02/19 0310     Troponin I <0 02 ng/mL     Protime-INR [[id number]]  (Normal) Collected:  12/02/19 0237    Lab Status:  Final result Specimen:  Blood from Arm, Left Updated:  12/02/19 0303     Protime 12 0 seconds      INR 0 89    APTT [[id number]]  (Normal) Collected:  12/02/19 0237    Lab Status:  Final result Specimen:  Blood from Arm, Left Updated:  12/02/19 0303     PTT 28 seconds     Basic metabolic panel [[id number]]  (Abnormal) Collected:  12/02/19 0237 Lab Status:  Final result Specimen:  Blood from Arm, Left Updated:  12/02/19 0301     Sodium 145 mmol/L      Potassium 3 3 mmol/L      Chloride 106 mmol/L      CO2 28 mmol/L      ANION GAP 11 mmol/L      BUN 13 mg/dL      Creatinine 0 75 mg/dL      Glucose 100 mg/dL      Calcium 8 7 mg/dL      eGFR 89 ml/min/1 73sq m     Narrative:       [place] guidelines for Chronic Kidney Disease (CKD):     Stage 1 with normal or high GFR (GFR > 90 mL/min/1 73 square meters)    Stage 2 Mild CKD (GFR = 60-89 mL/min/1 73 square meters)    Stage 3A Moderate CKD (GFR = 45-59 mL/min/1 73 square meters)    Stage 3B Moderate CKD (GFR = 30-44 mL/min/1 73 square meters)    Stage 4 Severe CKD (GFR = 15-29 mL/min/1 73 square meters)    Stage 5 End Stage CKD (GFR <15 mL/min/1 73 square meters)  Note: GFR calculation is accurate only with a steady state creatinine    POCT Chem 8+ [[id number]]  (Abnormal) Collected:  12/02/19 0246    Lab Status:  Final result Specimen:  Venous Updated:  12/02/19 0250     SODIUM, I-STAT 143 mmol/l      Potassium, i-STAT 3 6 mmol/L      Chloride, istat 107 mmol/L      CO2, i-STAT 26 mmol/L      Anion Gap, i-STAT 14 mmol/L      Calcium, Ionized i-STAT 1 12 mmol/L      BUN, I-STAT 12 mg/dl      Creatinine, i-STAT 0 5 mg/dl      eGFR 109 ml/min/1 73sq m      Glucose, i-STAT 106 mg/dl      Hct, i-STAT 41 %      Hgb, i-STAT 13 9 g/dl      Specimen Type VENOUS    Narrative:       National Kid[employer] guidelines for Chronic Kidney Disease (CKD):     Stage 1 with normal or high GFR (GFR > 90 mL/min/1 73 square meters)    Stage 2 Mild CKD (GFR = 60-89 mL/min/1 73 square meters)    Stage 3A Moderate CKD (GFR = 45-59 mL/min/1 73 square meters)    Stage 3B Moderate CKD (GFR = 30-44 mL/min/1 73 square meters)    Stage 4 Severe CKD (GFR = 15-29 mL/min/1 73 square meters)    Stage 5 End Stage CKD (GFR <15 mL/min/1 73 square meters)  Note: GFR calculation is accurate only with a steady state creatinine    CBC and Platelet [[id number]]  (Abnormal) Collected:  12/02/19 0237    Lab Status:  Final result Specimen:  Blood from Arm, Left Updated:  12/02/19 0250     WBC 12 72 Thousand/uL      RBC 4 91 Million/uL      Hemoglobin 14 5 g/dL      Hematocrit 44 8 %      MCV 91 fL      MCH 29 5 pg      MCHC 32 4 g/dL      RDW 11 9 %      Platelets 029 Thousands/uL      MPV 9 6 fL                  X-ray chest 1 view portable   Final Result by Laxmi Shay DO (12/02 0309)      Lung volumes are low but no acute cardiopulmonary disease is seen  Workstation performed: [id number]         CTA stroke alert (head/neck)   Final Result by Lula Camejo MD (12/02 0326)      No acute intracranial abnormality  No focal stenosis or saccular aneurysm within the [place]  No hemodynamically significant stenosis within either common or internal carotid artery  Findings were directly discussed with Nick Eugene on 12/2/2019 3:20 AM                      Workstation performed: [id number]         CT stroke alert brain   Final Result by Lula Camejo MD (12/02 0325)      Normal examination  Findings were directly discussed with Nick Eugene on 12/2/2019 3:20 AM       Workstation performed: [id number]                    Procedures  Procedures       ED Course  ED Course as of Dec 02 0417   Mon Dec 02, 2019   0480 Spoke to neurology who recommended hold TPA for now, give TPA if patient is within window and decompensates neurologically, more specifically, display worsening neurological deficits  Instructed administration of aspirin if CT negative for bleed or stroke  Due to NIHSS score of 2-3, neurology believes presentation is more consistent with transient global amnesia possibly related to recent death and grief versus stroke         9338 Will admin aspirin with negative CTA        0358 Upon re-examine, patient stating she remembers seeing me before but "in a dream, it was like I was in a dream before and now I feel much more clear and like myself"  BP decreased from initial  No further neurological deficits  Stable at this time  Swallow eval performed, normal  Upon questioning regarding death of son in law, patient cannot remember name of person who  or relation, states "we were very sad about someone, why can't I remember " Patient able to remember family member's name and relation after approx  A minute, however does not remember attending memorial service or events surrounding death  Stroke Assessment     Row Name 19 0411             NIH Stroke Scale    Interval  Baseline      Level of Consciousness (1a )  0      LOC Questions (1b )  0      LOC Commands (1c )  0      Best Gaze (2 )  0      Visual (3 )  0      Facial Palsy (4 )  0      Motor Arm, Left (5a )  0      Motor Arm, Right (5b )  0      Motor Leg, Left (6a )  0      Motor Leg, Right (6b )  0      Limb Ataxia (7 )  0      Sensory (8 )  0      Best Language (9 )  1      Dysarthria (10 )  1      Extinction and Inattention (11 ) (Formerly Neglect)  0      Total  2          First Filed Value   TPA Decision  -- Lexy Mcdaniels per neurology ]            Initial Sepsis Screening     Row Name 19 0342                Is the patient's history suggestive of a new or worsening infection? No  -CE        Suspected source of infection          Are two or more of the following signs & symptoms of infection both present and new to the patient? No  -CE        Indicate SIRS criteria          If the answer is yes to both questions, suspicion of sepsis is present          If severe sepsis is present AND tissue hypoperfusion perists in the hour after fluid resuscitation or lactate > 4, the patient meets criteria for SEPTIC SHOCK          Are any of the following organ dysfunction criteria present within 6 hours of suspected infection and SIRS criteria that are NOT considered to be chronic conditions?    Organ dysfunction          Date of presentation of severe sepsis          Time of presentation of severe sepsis          Tissue hypoperfusion persists in the hour after crystalloid fluid administration, evidenced, by either:          Was hypotension present within one hour of the conclusion of crystalloid fluid administration?         Date of presentation of septic shock          Time of presentation of septic shock            User Key  (r) = Recorded By, (t) = Taken By, (c) = Cosigned By    [address] Name Provider Asia De León MD Physician                  MDM  Number of Diagnoses or Management Options  Altered mental status:   Diagnosis management comments: 61 y/o female with PMH significant for VSD without repair and hypercholesterolemia who presents via  for complaint of confusion upon waking tonight  Last seen normal at approx  11am however acting different all day, as previously specified  On exam, well appearing female, no acute distress, BP elevated, oxygenating well, AAOx3, becomes tearful upon questioning and realizing that she cannot remember why she is in the ER, no facial droop or numbness, neglect, visual loss, one episode of dysarthria, comprehensive aphasia as there is a visible delay in command processing, sensory deficits, limb ataxia, motor deficits, facial palsy or gaze impairment  Differential diagnosis of AMS includes:  ischemic versus hemorrhagic stroke, TIA, infection, hypoglycemia, seizure, neoplasm, electrolyte disorder, substance abuse, transient global amnesia, conversion disorder, dissociative amnesia, SAH    Will proceed with stroke alert, as patient is NIHSS score of 2  See ED course note for decision for TPA  Unknown definitive last normal, believed to be 11pm, therefore patient within window       Will obtain:   CBC to assess for anemia and leukocytosis   BMP to assess electrolytes and kidney function, glucose  UA to assess for UTI  Troponin and EKG  Drug screen  PT INR  Type and screen  CTA head and neck       Amount and/or Complexity of Data Reviewed  Clinical lab tests: ordered and reviewed  Tests in the radiology section of CPT®: ordered and reviewed  Tests in the medicine section of CPT®: ordered and reviewed  Discussion of test results with the performing providers: yes  Decide to obtain previous medical records or to obtain history from someone other than the patient: yes  Obtain history from someone other than the patient: yes  Review and summarize past medical records: yes  Discuss the patient with other providers: yes  Independent visualization of images, tracings, or specimens: yes    Risk of Complications, Morbidity, and/or Mortality  General comments: Labs unremarkable  Per radiologist, no evidence of stroke, aneurysm, tumor  Case discussed with Dr Rufus Murphy, patient accepted for observation  Patient Progress  Patient progress: stable      Disposition  Final diagnoses: Altered mental status     Time reflects when diagnosis was documented in both MDM as applicable and the Disposition within this note     Time User Action Codes Description Comment    12/2/2019  3:41 AM Ardith Beam Add [R41 0] Disorientation     12/2/2019  3:41 AM Ardith Beam Add [R41 82] Altered mental status     12/2/2019  3:41 AM Ardith Beam Modify [R41 82] Altered mental status     12/2/2019  3:41 AM Ardith Beam Remove [R41 0] Disorientation       ED Disposition     ED Disposition Condition Date/Time Comment    Admit Stable Mon Dec 2, 2019  3:40 AM Case was discussed with Eufemia Marti and the patient's admission status was agreed to be Admission Status: observation status to the service of Dr Eufemia Marti   Follow-up Information    None         Patient's Medications   Discharge Prescriptions    No medications on file     No discharge procedures on file      ED Provider  Electronically Signed by           Konstantin Grace PA-C  12/02/19 5610

## 2019-12-02 NOTE — ASSESSMENT & PLAN NOTE
TGA versus CVA  Telemetry monitoring  Aspirin given in the emergency department  Neurology consultation  Lipitor 40 mg now  Check fasting lipid profile  Stroke protocol  Neuro checks  Echocardiogram  MRI brain  Fall precautions  Supportive care

## 2019-12-02 NOTE — PLAN OF CARE
Problem: Neurological Deficit  Goal: Neurological status is stable or improving  Description  Interventions:  - Monitor and assess patient's level of consciousness, motor function, sensory function, and level of assistance needed for ADLs  - Monitor and report changes from baseline  Collaborate with interdisciplinary team to initiate plan and implement interventions as ordered  - Provide and maintain a safe environment  - Consider seizure precautions  - Consider fall precautions  - Consider aspiration precautions  - Consider bleeding precautions  12/2/2019 1200 by Devin Navarro RN  Outcome: Adequate for Discharge  12/2/2019 1109 by Devin Navarro RN  Outcome: Progressing     Problem: Activity Intolerance/Impaired Mobility  Goal: Mobility/activity is maintained at optimum level for patient  Description  Interventions:  - Assess and monitor patient  barriers to mobility and need for assistive/adaptive devices  - Assess patient's emotional response to limitations  - Collaborate with interdisciplinary team and initiate plans and interventions as ordered  - Encourage independent activity per ability   - Maintain proper body alignment  - Perform active/passive rom as tolerated/ordered  - Plan activities to conserve energy   - Turn patient as appropriate  12/2/2019 1200 by Devin Navarro RN  Outcome: Adequate for Discharge  12/2/2019 1109 by Devin Navarro RN  Outcome: Progressing     Problem: Communication Impairment  Goal: Ability to express needs and understand communication  Description  Assess patient's communication skills and ability to understand information  Patient will demonstrate use of effective communication techniques, alternative methods of communication and understanding even if not able to speak  - Encourage communication and provide alternate methods of communication as needed  - Collaborate with case management/ for discharge needs    - Include patient/family/caregiver in decisions related to communication  12/2/2019 1200 by Clarence Nguyen RN  Outcome: Adequate for Discharge  12/2/2019 1109 by Clarence Nguyen RN  Outcome: Progressing     Problem: Potential for Aspiration  Goal: Non-ventilated patient's risk of aspiration is minimized  Description  Assess and monitor vital signs, respiratory status, and labs (WBC)  Monitor for signs of aspiration (tachypnea, cough, rales, wheezing, cyanosis, fever)  - Assess and monitor patient's ability to swallow  - Place patient up in chair to eat if possible  - HOB up at 90 degrees to eat if unable to get patient up into chair   - Supervise patient during oral intake  - Instruct patient/ family to take small bites  - Instruct patient/ family to take small single sips when taking liquids  - Follow patient-specific strategies generated by speech pathologist   12/2/2019 1200 by Clarence Nguyen RN  Outcome: Adequate for Discharge  12/2/2019 1109 by Clarence Nguyen RN  Outcome: Progressing  Goal: Ventilated patient's risk of aspiration is minimized  Description  Assess and monitor vital signs, respiratory status, airway cuff pressure, and labs (WBC)  Monitor for signs of aspiration (tachypnea, cough, rales, wheezing, cyanosis, fever)  - Elevate head of bed 30 degrees if patient has tube feeding   - Monitor tube feeding  12/2/2019 1200 by Clarence Nguyen RN  Outcome: Adequate for Discharge  12/2/2019 1109 by Clarence Nguyen RN  Outcome: Progressing     Problem: Nutrition  Goal: Nutrition/Hydration status is improving  Description  Monitor and assess patient's nutrition/hydration status for malnutrition (ex- brittle hair, bruises, dry skin, pale skin and conjunctiva, muscle wasting, smooth red tongue, and disorientation)  Collaborate with interdisciplinary team and initiate plan and interventions as ordered  Monitor patient's weight and dietary intake as ordered or per policy  Utilize nutrition screening tool and intervene per policy   Determine patient's food preferences and provide high-protein, high-caloric foods as appropriate  - Assist patient with eating   - Allow adequate time for meals   - Encourage patient to take dietary supplement as ordered  - Collaborate with clinical nutritionist   - Include patient/family/caregiver in decisions related to nutrition  12/2/2019 1200 by Merle Pak RN  Outcome: Adequate for Discharge  12/2/2019 1109 by Merle Pak RN  Outcome: Progressing     Problem: Potential for Falls  Goal: Patient will remain free of falls  Description  INTERVENTIONS:  - Assess patient frequently for physical needs  -  Identify cognitive and physical deficits and behaviors that affect risk of falls    -  [place] fall precautions as indicated by assessment   - Educate patient/family on patient safety including physical limitations  - Instruct patient to call for assistance with activity based on assessment  - Modify environment to reduce risk of injury  - Consider OT/PT consult to assist with strengthening/mobility  Outcome: Adequate for Discharge

## 2019-12-09 ENCOUNTER — OFFICE VISIT (OUTPATIENT)
Dept: FAMILY MEDICINE CLINIC | Facility: CLINIC | Age: 54
End: 2019-12-09
Payer: COMMERCIAL

## 2019-12-09 VITALS
WEIGHT: 150 LBS | SYSTOLIC BLOOD PRESSURE: 134 MMHG | HEIGHT: 62 IN | DIASTOLIC BLOOD PRESSURE: 82 MMHG | HEART RATE: 68 BPM | BODY MASS INDEX: 27.6 KG/M2 | OXYGEN SATURATION: 99 % | TEMPERATURE: 98.5 F

## 2019-12-09 DIAGNOSIS — F41.8 DEPRESSION WITH ANXIETY: Primary | ICD-10-CM

## 2019-12-09 DIAGNOSIS — E78.2 MIXED HYPERLIPIDEMIA: ICD-10-CM

## 2019-12-09 DIAGNOSIS — R73.03 PRE-DIABETES: ICD-10-CM

## 2019-12-09 DIAGNOSIS — Z12.31 VISIT FOR SCREENING MAMMOGRAM: ICD-10-CM

## 2019-12-09 PROBLEM — R41.82 ALTERED MENTAL STATUS: Status: RESOLVED | Noted: 2019-12-02 | Resolved: 2019-12-09

## 2019-12-09 PROBLEM — Q21.0 VSD (VENTRICULAR SEPTAL DEFECT): Status: ACTIVE | Noted: 2019-12-09

## 2019-12-09 PROCEDURE — 1036F TOBACCO NON-USER: CPT | Performed by: FAMILY MEDICINE

## 2019-12-09 PROCEDURE — 3008F BODY MASS INDEX DOCD: CPT | Performed by: FAMILY MEDICINE

## 2019-12-09 PROCEDURE — 99214 OFFICE O/P EST MOD 30 MIN: CPT | Performed by: FAMILY MEDICINE

## 2019-12-09 RX ORDER — CITALOPRAM 20 MG/1
20 TABLET ORAL DAILY
Qty: 30 TABLET | Refills: 1 | Status: SHIPPED | OUTPATIENT
Start: 2019-12-09 | End: 2020-02-10 | Stop reason: SDUPTHER

## 2019-12-09 NOTE — PROGRESS NOTES
Mary Toure 1965 female MRN: [id number]      ASSESSMENT/PLAN  Problem List Items Addressed This Visit        Other    Mixed hyperlipidemia    Pre-diabetes    Depression with anxiety - Primary    Relevant Medications    citalopram (CeleXA) 20 mg tablet      Other Visit Diagnoses     Visit for screening mammogram        Relevant Orders    Mammo screening bilateral w cad        Depression with Anxiety:   PHQ 14  JOSE 5   Pt is already scheduled with a counselor, which I encouraged her to follow through with  Discussed risks/benefits of SSRIs  Pt agreeable  Discussed possible ADRs including GI upset, somnolence, and initial worsening of symptoms  Discussed onset of therapeutic efficacy is 4-8 weeks  RTC in 4 weeks to re-evaluate  Call if no tolerance prior to  Pre-diabetes: Diet counseling as below   HLD: LDL slightly above goal -- continue current Lipitor dosing, and diet modification as below     HM:   BP WNL   BMI as below   CRC screening UTD w/ Cologuard -- will request records  PAP UTD -- will request records   Script given for Mammo     BMI Counseling: Body mass index is 27 44 kg/m²  The BMI is above normal  Nutrition recommendations include consuming healthier snacks, increasing intake of lean protein and reducing intake of saturated fat and trans fat  Future Appointments   Date Time Provider Jamar Britt   1/10/2020  9:40 AM DO [facility]          SUBJECTIVE  CC: Establish Care      HPI:  Mary Toure is a 62 y o  female who presents to establish care  History reviewed and updated as below  Pt [facility] at [facility] on 12/2 due to altered mental status  On day of evaluation, pt states she had new onset confusion and forgetfulness  Pt underwent evaluation for stroke, including CT Head, CTA, and MRI brain, all of which were negative for acute process   She also had blood work, which demonstrated A1c 6 3%, Lipid panel with  (otherwise within goal), CBC slightly elevated  Her symptoms were thought to be secondary to conversion disorder and she was discharged to home  Neuro encouraged her to have an outpatient EEG  Of note, pt has had a number of stressors  She just moved to the area a year ago, and her  travels for work, so she is home alone a great deal  Her son-in-law also recently committed suicide, and she is helping to care for her grandchildren  Pt states in the past she has had "mini breakdowns"  In her 25s she saw a counselor, but does not recall why  Her  feels she had post-partum depression with her second child  And she felt similar while going through menopause  She states this happened daily when she first moved here a year ago, and then had calmed done somewhat until recently, when her son-in-law passed, and they began happening multiple times per day again  She describes them as "I forget things", her heard races, and she is quite tearful  She usually is able to breath through them  Review of Systems   Constitutional: Negative for unexpected weight change  HENT: Negative for congestion, ear pain, rhinorrhea and sore throat  Eyes: Negative for visual disturbance  Respiratory: Negative for cough and shortness of breath  Cardiovascular: Negative for chest pain, palpitations and leg swelling  Gastrointestinal: Negative for abdominal pain, constipation and diarrhea  Endocrine: Negative for polydipsia and polyuria  Genitourinary: Negative for difficulty urinating  Neurological: Negative for dizziness and headaches  Psychiatric/Behavioral: Negative for sleep disturbance  Historical Information   The patient history was reviewed and updated as follows:    Past Medical History:   Diagnosis Date    Altered mental status 12/2/2019    Hyperlipidemia      No past surgical history on file    Family History   Problem Relation Age of Onset    Breast cancer Mother     Brain cancer Brother       Social History Social History     Substance and Sexual Activity   Alcohol Use Yes    Alcohol/week: 1 0 standard drinks    Types: 1 Glasses of wine per week    Frequency: Monthly or less    Drinks per session: 1 or 2     Social History     Substance and Sexual Activity   Drug Use Never     Social History     Tobacco Use   Smoking Status Former Smoker   Smokeless Tobacco Never Used   Tobacco Comment    16-21 years old, <1 ppd        Medications:     Current Outpatient Medications:     atorvastatin (LIPITOR) 10 mg tablet, Take 10 mg by mouth daily, Disp: , Rfl:     citalopram (CeleXA) 20 mg tablet, Take 1 tablet (20 mg total) by mouth daily, Disp: 30 tablet, Rfl: 1  No Known Allergies    OBJECTIVE    Vitals:   Vitals:    12/09/19 1407   BP: 134/82   Pulse: 68   Temp: 98 5 °F (36 9 °C)   SpO2: 99%   Weight: 68 kg (150 lb)   Height: 5' 2" (1 575 m)           Physical Exam   Constitutional: She appears well-developed and well-nourished  No distress  HENT:   Head: Normocephalic and atraumatic  Right Ear: External ear normal    Left Ear: External ear normal    Nose: Nose normal    Mouth/Throat: Oropharynx is clear and moist    Eyes: Conjunctivae are normal    Neck: No thyromegaly present  Cardiovascular: Normal rate and regular rhythm  Pulmonary/Chest: Effort normal and breath sounds normal  No respiratory distress  Abdominal: Soft  Bowel sounds are normal  She exhibits no distension  There is no tenderness  Musculoskeletal: She exhibits no edema  Lymphadenopathy:     She has no cervical adenopathy  Neurological: She is alert  Skin: Skin is warm and dry  Psychiatric: She has a normal mood and affect  Vitals reviewed                   DO [facility]   12/9/2019  3:05 PM

## 2020-01-24 ENCOUNTER — OFFICE VISIT (OUTPATIENT)
Dept: FAMILY MEDICINE CLINIC | Facility: CLINIC | Age: 55
End: 2020-01-24
Payer: COMMERCIAL

## 2020-01-24 VITALS
WEIGHT: 146.8 LBS | SYSTOLIC BLOOD PRESSURE: 118 MMHG | OXYGEN SATURATION: 98 % | BODY MASS INDEX: 27.02 KG/M2 | HEART RATE: 71 BPM | DIASTOLIC BLOOD PRESSURE: 80 MMHG | HEIGHT: 62 IN | TEMPERATURE: 97.7 F

## 2020-01-24 DIAGNOSIS — F41.8 DEPRESSION WITH ANXIETY: Primary | ICD-10-CM

## 2020-01-24 PROCEDURE — 99213 OFFICE O/P EST LOW 20 MIN: CPT | Performed by: FAMILY MEDICINE

## 2020-01-24 PROCEDURE — 1036F TOBACCO NON-USER: CPT | Performed by: FAMILY MEDICINE

## 2020-01-24 PROCEDURE — 3008F BODY MASS INDEX DOCD: CPT | Performed by: FAMILY MEDICINE

## 2020-01-24 NOTE — PROGRESS NOTES
Marge Waters 1965 female MRN: [id number]      ASSESSMENT/PLAN  Problem List Items Addressed This Visit        Other    Depression with anxiety - Primary        Depression/Anxiety: Much improved on current regimen  Plan to continue for at least 6 months, then can consider tapering  Encouraged to schedule Mammogram   CRC screening: UTD -- located Cologuard, which was negative -- will scan into chart   Hep C, HIV screening -- pt agreeable, but would like to wait until she does her other lab work at the end of the year      Future Appointments   Date Time Provider Jamar Brtit   7/10/2020  9:40 AM DO [facility]          SUBJECTIVE  CC: Depression (Patient seen in office today for a one month follow up - yesenia stated that she is doing [facility] BETTER!!) and Anxiety      HPI:  Marge Waters is a 62 y o  female who presents for follow up of depression/anxiety  12/9: Pt initiated on Celexa 20 mg daily     Pt states she is feeling much better  She still has some difficult with motivation to get out of bed in the morning, but she is no longer excessively tearful and feels "stronger"  She continues to work with her therapist, and is facing things rather than running from them  She had one episode of emotional lability after receiving a bad text, but was able to manage this  Review of Systems   Psychiatric/Behavioral: Negative for sleep disturbance  The patient is nervous/anxious (much improved)  Historical Information   The patient history was reviewed and updated as follows:    Past Medical History:   Diagnosis Date    Altered mental status 12/2/2019    Hyperlipidemia      History reviewed  No pertinent surgical history    Family History   Problem Relation Age of Onset    Breast cancer Mother     Brain cancer Brother       Social History   Social History     Substance and Sexual Activity   Alcohol Use Yes    Alcohol/week: 1 0 standard drinks    Types: 1 Glasses of wine per week    Frequency: Monthly or less    Drinks per session: 1 or 2     Social History     Substance and Sexual Activity   Drug Use Never     Social History     Tobacco Use   Smoking Status Former Smoker   Smokeless Tobacco Never Used   Tobacco Comment    [age] years old, <1 ppd        Medications:     Current Outpatient Medications:     atorvastatin (LIPITOR) 10 mg tablet, Take 10 mg by mouth daily, Disp: , Rfl:     citalopram (CeleXA) 20 mg tablet, Take 1 tablet (20 mg total) by mouth daily, Disp: 30 tablet, Rfl: 1  No Known Allergies    OBJECTIVE    Vitals:   Vitals:    01/24/20 0934   BP: 118/80   Pulse: 71   Temp: 97 7 °F (36 5 °C)   SpO2: 98%   Weight: 66 6 kg (146 lb 12 8 oz)   Height: 5' 2" (1 575 m)           Physical Exam   Constitutional: She appears well-developed and well-nourished  No distress  HENT:   Head: Normocephalic and atraumatic  Pulmonary/Chest: Effort normal    Neurological: She is alert  Psychiatric: She has a normal mood and affect  Vitals reviewed                   Jameel Zafar DO  [facility]   1/24/2020  9:54 AM

## 2020-02-10 DIAGNOSIS — F41.8 DEPRESSION WITH ANXIETY: ICD-10-CM

## 2020-02-10 RX ORDER — CITALOPRAM 20 MG/1
20 TABLET ORAL DAILY
Qty: 90 TABLET | Refills: 1 | Status: SHIPPED | OUTPATIENT
Start: 2020-02-10 | End: 2020-08-10 | Stop reason: SDUPTHER

## 2020-06-26 ENCOUNTER — OFFICE VISIT (OUTPATIENT)
Dept: FAMILY MEDICINE CLINIC | Facility: CLINIC | Age: 55
End: 2020-06-26
Payer: COMMERCIAL

## 2020-06-26 VITALS
DIASTOLIC BLOOD PRESSURE: 82 MMHG | WEIGHT: 144.8 LBS | BODY MASS INDEX: 26.65 KG/M2 | SYSTOLIC BLOOD PRESSURE: 128 MMHG | HEIGHT: 62 IN | TEMPERATURE: 97.8 F

## 2020-06-26 DIAGNOSIS — E78.2 MIXED HYPERLIPIDEMIA: ICD-10-CM

## 2020-06-26 DIAGNOSIS — Z12.39 SCREENING FOR BREAST CANCER: ICD-10-CM

## 2020-06-26 DIAGNOSIS — F41.8 DEPRESSION WITH ANXIETY: Primary | ICD-10-CM

## 2020-06-26 DIAGNOSIS — Z20.822 CLOSE EXPOSURE TO COVID-19 VIRUS: ICD-10-CM

## 2020-06-26 DIAGNOSIS — R73.03 PRE-DIABETES: ICD-10-CM

## 2020-06-26 PROCEDURE — 3008F BODY MASS INDEX DOCD: CPT | Performed by: FAMILY MEDICINE

## 2020-06-26 PROCEDURE — 1036F TOBACCO NON-USER: CPT | Performed by: FAMILY MEDICINE

## 2020-06-26 PROCEDURE — 99214 OFFICE O/P EST MOD 30 MIN: CPT | Performed by: FAMILY MEDICINE

## 2020-06-30 LAB
EXTERNAL SARS COV-2 ANTIBODY IGG: NEGATIVE
HBA1C MFR BLD HPLC: 5.9 %

## 2020-07-02 LAB
ALBUMIN SERPL-MCNC: 4.1 G/DL (ref 3.8–4.9)
ALBUMIN/GLOB SERPL: 1.6 {RATIO} (ref 1.2–2.2)
ALP SERPL-CCNC: 77 IU/L (ref 39–117)
ALT SERPL-CCNC: 17 IU/L (ref 0–32)
AST SERPL-CCNC: 17 IU/L (ref 0–40)
BILIRUB SERPL-MCNC: 0.5 MG/DL (ref 0–1.2)
BUN SERPL-MCNC: 11 MG/DL (ref 6–24)
BUN/CREAT SERPL: 15 (ref 9–23)
CALCIUM SERPL-MCNC: 9.1 MG/DL (ref 8.7–10.2)
CHLORIDE SERPL-SCNC: 104 MMOL/L (ref 96–106)
CHOLEST SERPL-MCNC: 160 MG/DL (ref 100–199)
CO2 SERPL-SCNC: 26 MMOL/L (ref 20–29)
CREAT SERPL-MCNC: 0.73 MG/DL (ref 0.57–1)
GLOBULIN SER-MCNC: 2.5 G/DL (ref 1.5–4.5)
GLUCOSE SERPL-MCNC: 85 MG/DL (ref 65–99)
HBA1C MFR BLD: 5.9 % (ref 4.8–5.6)
HDLC SERPL-MCNC: 49 MG/DL
LDLC SERPL CALC-MCNC: 88 MG/DL (ref 0–99)
POTASSIUM SERPL-SCNC: 4.1 MMOL/L (ref 3.5–5.2)
PROT SERPL-MCNC: 6.6 G/DL (ref 6–8.5)
SARS-COV-2 IGG SERPL QL IA: NEGATIVE
SL AMB EGFR AFRICAN AMERICAN: 106 ML/MIN/1.73
SL AMB EGFR NON AFRICAN AMERICAN: 92 ML/MIN/1.73
SL AMB VLDL CHOLESTEROL CALC: 23 MG/DL (ref 5–40)
SODIUM SERPL-SCNC: 141 MMOL/L (ref 134–144)
TRIGL SERPL-MCNC: 116 MG/DL (ref 0–149)

## 2020-07-21 ENCOUNTER — HOSPITAL ENCOUNTER (OUTPATIENT)
Dept: MAMMOGRAPHY | Facility: CLINIC | Age: 55
Discharge: HOME | End: 2020-07-21
Payer: COMMERCIAL

## 2020-07-21 VITALS — WEIGHT: 144 LBS | BODY MASS INDEX: 26.5 KG/M2 | HEIGHT: 62 IN

## 2020-07-21 DIAGNOSIS — Z12.39 SCREENING FOR BREAST CANCER: ICD-10-CM

## 2020-07-21 PROCEDURE — 77067 SCR MAMMO BI INCL CAD: CPT

## 2020-07-21 PROCEDURE — 77063 BREAST TOMOSYNTHESIS BI: CPT

## 2020-08-10 DIAGNOSIS — F41.8 DEPRESSION WITH ANXIETY: ICD-10-CM

## 2020-08-10 RX ORDER — CITALOPRAM 20 MG/1
30 TABLET ORAL DAILY
Qty: 90 TABLET | Refills: 1 | Status: SHIPPED | OUTPATIENT
Start: 2020-08-10 | End: 2020-11-02

## 2020-08-14 ENCOUNTER — HOSPITAL ENCOUNTER (OUTPATIENT)
Dept: MAMMOGRAPHY | Facility: CLINIC | Age: 55
Discharge: HOME | End: 2020-08-14
Payer: COMMERCIAL

## 2020-08-14 ENCOUNTER — TRANSCRIBE ORDERS (OUTPATIENT)
Dept: MAMMOGRAPHY | Facility: CLINIC | Age: 55
End: 2020-08-14

## 2020-08-14 ENCOUNTER — HOSPITAL ENCOUNTER (OUTPATIENT)
Dept: ULTRASOUND IMAGING | Facility: CLINIC | Age: 55
Discharge: HOME | End: 2020-08-14
Payer: COMMERCIAL

## 2020-08-14 VITALS — HEIGHT: 62 IN | WEIGHT: 144 LBS | BODY MASS INDEX: 26.5 KG/M2

## 2020-08-14 DIAGNOSIS — R92.8 ABNORMAL MAMMOGRAM: ICD-10-CM

## 2020-08-14 DIAGNOSIS — R92.8 ABNORMAL MAMMOGRAM: Primary | ICD-10-CM

## 2020-08-14 PROCEDURE — 76642 ULTRASOUND BREAST LIMITED: CPT

## 2020-08-14 PROCEDURE — G0279 TOMOSYNTHESIS, MAMMO: HCPCS

## 2020-08-14 PROCEDURE — 77065 DX MAMMO INCL CAD UNI: CPT

## 2020-08-17 DIAGNOSIS — E78.2 MIXED HYPERLIPIDEMIA: Primary | ICD-10-CM

## 2020-08-17 RX ORDER — ATORVASTATIN CALCIUM 10 MG/1
10 TABLET, FILM COATED ORAL DAILY
Qty: 90 TABLET | Refills: 3 | Status: SHIPPED | OUTPATIENT
Start: 2020-08-17 | End: 2021-05-24 | Stop reason: SDUPTHER

## 2020-10-31 DIAGNOSIS — F41.8 DEPRESSION WITH ANXIETY: ICD-10-CM

## 2020-11-02 RX ORDER — CITALOPRAM 20 MG/1
TABLET ORAL
Qty: 135 TABLET | Refills: 1 | Status: SHIPPED | OUTPATIENT
Start: 2020-11-02 | End: 2021-05-12

## 2020-12-31 ENCOUNTER — OFFICE VISIT (OUTPATIENT)
Dept: FAMILY MEDICINE CLINIC | Facility: CLINIC | Age: 55
End: 2020-12-31
Payer: COMMERCIAL

## 2020-12-31 VITALS
HEART RATE: 63 BPM | WEIGHT: 150 LBS | SYSTOLIC BLOOD PRESSURE: 110 MMHG | DIASTOLIC BLOOD PRESSURE: 78 MMHG | OXYGEN SATURATION: 97 % | TEMPERATURE: 97.2 F | HEIGHT: 62 IN | BODY MASS INDEX: 27.6 KG/M2

## 2020-12-31 DIAGNOSIS — Z11.4 SCREENING FOR HIV (HUMAN IMMUNODEFICIENCY VIRUS): ICD-10-CM

## 2020-12-31 DIAGNOSIS — M25.532 LEFT WRIST PAIN: ICD-10-CM

## 2020-12-31 DIAGNOSIS — R73.03 PRE-DIABETES: ICD-10-CM

## 2020-12-31 DIAGNOSIS — Z11.59 ENCOUNTER FOR HEPATITIS C SCREENING TEST FOR LOW RISK PATIENT: ICD-10-CM

## 2020-12-31 DIAGNOSIS — E78.2 MIXED HYPERLIPIDEMIA: Primary | ICD-10-CM

## 2020-12-31 DIAGNOSIS — Z12.83 SKIN CANCER SCREENING: ICD-10-CM

## 2020-12-31 DIAGNOSIS — F41.8 DEPRESSION WITH ANXIETY: ICD-10-CM

## 2020-12-31 PROCEDURE — 3008F BODY MASS INDEX DOCD: CPT | Performed by: FAMILY MEDICINE

## 2020-12-31 PROCEDURE — 99214 OFFICE O/P EST MOD 30 MIN: CPT | Performed by: FAMILY MEDICINE

## 2020-12-31 PROCEDURE — 1036F TOBACCO NON-USER: CPT | Performed by: FAMILY MEDICINE

## 2021-01-22 LAB
EXTERNAL HIV SCREEN: NORMAL
HBA1C MFR BLD HPLC: 6 %
HCV AB SER-ACNC: <0.1

## 2021-01-25 ENCOUNTER — TELEPHONE (OUTPATIENT)
Dept: FAMILY MEDICINE CLINIC | Facility: CLINIC | Age: 56
End: 2021-01-25

## 2021-01-25 NOTE — TELEPHONE ENCOUNTER
Would recommend referral to OT -- can also consider Ortho Hand evaluation if she would like  Please let me know  Thanks!

## 2021-01-26 DIAGNOSIS — M25.532 LEFT WRIST PAIN: Primary | ICD-10-CM

## 2021-01-26 LAB
ALBUMIN SERPL-MCNC: 4.2 G/DL (ref 3.8–4.9)
ALBUMIN/GLOB SERPL: 1.8 {RATIO} (ref 1.2–2.2)
ALP SERPL-CCNC: 86 IU/L (ref 39–117)
ALT SERPL-CCNC: 29 IU/L (ref 0–32)
AST SERPL-CCNC: 22 IU/L (ref 0–40)
BILIRUB SERPL-MCNC: 0.5 MG/DL (ref 0–1.2)
BUN SERPL-MCNC: 10 MG/DL (ref 6–24)
BUN/CREAT SERPL: 13 (ref 9–23)
CALCIUM SERPL-MCNC: 9.5 MG/DL (ref 8.7–10.2)
CHLORIDE SERPL-SCNC: 105 MMOL/L (ref 96–106)
CHOLEST SERPL-MCNC: 162 MG/DL (ref 100–199)
CO2 SERPL-SCNC: 26 MMOL/L (ref 20–29)
CREAT SERPL-MCNC: 0.77 MG/DL (ref 0.57–1)
EST. AVERAGE GLUCOSE BLD GHB EST-MCNC: 126 MG/DL
GLOBULIN SER-MCNC: 2.3 G/DL (ref 1.5–4.5)
GLUCOSE SERPL-MCNC: 88 MG/DL (ref 65–99)
HBA1C MFR BLD: 6 % (ref 4.8–5.6)
HCV AB S/CO SERPL IA: <0.1 S/CO RATIO (ref 0–0.9)
HDLC SERPL-MCNC: 50 MG/DL
HIV 1+2 AB+HIV1 P24 AG SERPL QL IA: NON REACTIVE
LDLC SERPL CALC-MCNC: 94 MG/DL (ref 0–99)
POTASSIUM SERPL-SCNC: 4.9 MMOL/L (ref 3.5–5.2)
PROT SERPL-MCNC: 6.5 G/DL (ref 6–8.5)
SL AMB EGFR AFRICAN AMERICAN: 99 ML/MIN/1.73
SL AMB EGFR NON AFRICAN AMERICAN: 86 ML/MIN/1.73
SL AMB VLDL CHOLESTEROL CALC: 18 MG/DL (ref 5–40)
SODIUM SERPL-SCNC: 143 MMOL/L (ref 134–144)
TRIGL SERPL-MCNC: 98 MG/DL (ref 0–149)

## 2021-01-26 NOTE — TELEPHONE ENCOUNTER
I'll put in a referral for OT and if it is not improving, we will get her set up with Ortho Hand  Thanks!

## 2021-02-08 ENCOUNTER — EVALUATION (OUTPATIENT)
Dept: OCCUPATIONAL THERAPY | Facility: CLINIC | Age: 56
End: 2021-02-08
Payer: COMMERCIAL

## 2021-02-08 DIAGNOSIS — M25.532 LEFT WRIST PAIN: Primary | ICD-10-CM

## 2021-02-08 PROCEDURE — 97165 OT EVAL LOW COMPLEX 30 MIN: CPT

## 2021-02-08 RX ORDER — IBUPROFEN 200 MG
600 TABLET ORAL EVERY 6 HOURS PRN
COMMUNITY

## 2021-02-08 NOTE — PROGRESS NOTES
OT Evaluation     Today's date: 2021  Patient name: Farnco Vaughn  : 1965  MRN: [id number]  Referring provider: Baljinder Menchaca DO  Dx:   Encounter Diagnosis     ICD-10-CM    1  Left wrist pain  M25 532                   Assessment  Assessment details: This is a 60year old, right hand dominant female being seen for pain in the left wrist with an onset of 2020 following a fall  Patient has soft tissue deformity on ulnar side of the left wrist   Pain with TFCC squeeze and loading  Pain with weight bearing into the wrist  AROM of the wrist is decreased and painful  Digit ROM is WNL  Sensation is intact  Strength is impaired due to pain  Patient may benefit from further testing to rule out TFCC injury  Will begin OT 2x/wk x 4 weeks to reduce pain  Impairments: activity intolerance, impaired physical strength, lacks appropriate home exercise program, pain with function and weight-bearing intolerance  Other impairment: soft tissue deformity ulnar aspect of wrist  Functional limitations: Pain with grasping and resisted wrist motion  Symptom irritability: highBarriers to therapy: Possible TFCC or other ligamentous injury  Understanding of Dx/Px/POC: excellent   Prognosis: fair    Goals  STGs ( 4 weeks)  1  Patient will be independent in HEP to reduce pain in the left wrist and increase strength  2  Patient will report a 2 level decrease in left wrist pain during activities  3  Patient will demonstrate AROM of the left wrist WNL without pain  LTGs ( 8-12 weeks)  1  Patient will be independent in HEP to maintain left wrist and hand strength at discharge  2  Patient will demonstrate 5/5 wrist and forearm strength to be MI for home management tasks  3    Patient will demonstrate left hand  and pinch strength within 30% of the right hand to be MI for all daily tasks    Plan  Patient would benefit from: custom splinting, OT eval and skilled occupational therapy  Planned modality interventions: ultrasound, thermotherapy: hydrocollator packs and cryotherapy  Planned therapy interventions: activity modification, compression, graded activity, graded exercise, home exercise program, therapeutic exercise, therapeutic activities, strengthening, patient education, orthotic fitting/training, Telles taping and manual therapy  Frequency: 2x week  Duration in weeks: 12  Plan of Care beginning date: 2021  Plan of Care expiration date: 2021  Treatment plan discussed with: patient        Subjective Evaluation    History of Present Illness  Onset date: 2020  Mechanism of injury: Patient reports "bump" on wrist since early 2020  She reports she did have a fall in December, but did not recall injuring the wrist  She reports pain in the left wrist at the end of the day  She has tried wearing a wrist splint, but her pain has persisted  Patient wears the wrist brace prn during the day  Recurrent probem    Quality of life: good    Pain  Current pain ratin  At best pain ratin  At worst pain ratin  Location: Ulnar side of the wrist  Quality: sharp and throbbing  Relieving factors: medications, ice and heat (Ibuprofen prn; wrist brace)  Exacerbated by: grasping, particularly with middle, ring, small fingers  Progression: no change    Social Support  Lives with: spouse, young children and adult children    Employment status: working (Part time at [employer] )  Hand dominance: right      Diagnostic Tests  No diagnostic tests performed  Treatments  Treatments tried: wrist brace  Patient Goals  Patient goals for therapy: decreased edema, decreased pain, increased motion, increased strength, independence with ADLs/IADLs and return to sport/leisure activities  Patient goal: Be able to do dishes without pain        Objective     Observations     Left Wrist/Hand   Positive for deformity       Additional Observation Details  21:  Soft deformity on lateral aspect of wrist distal to head of ulna    Tenderness     Left Wrist/Hand   Tenderness in the TFCC and triquetrum  No tenderness in the sixth dorsal compartment, distal radioulnar joint, hook of hamate, lunate, pisiform and capitate  Neurological Testing     Sensation     Wrist/Hand   Left   Intact: light touch    Active Range of Motion     Left Wrist   Wrist flexion: 70 degrees with pain  Wrist extension: 65 degrees with pain  Radial deviation: 35 degrees with pain  Ulnar deviation: 30 degrees with pain      Additional Active Range of Motion Details  2/8/21: digit ROM is WNL    Strength/Myotome Testing     Left Wrist/Hand   Wrist extension: 5  Wrist flexion: 4  Radial deviation: 5  Ulnar deviation: 5     (2nd hand position)     Trial 1: 35    Right Wrist/Hand      (2nd hand position)     Trial 1: 73    Additional Strength Details  2/8/21: Pain with resistive wrist flexion  Mild discomfort with ulnar wrist deviation  Tests     Left Wrist/Hand   Positive TFCC load  Swelling     Left Wrist/Hand   Circumference wrist: 15 2 cm    Right Wrist/Hand   Circumference wrist: 14 5 cm             Precautions:  Ventricular septal defect    Possible TFCC injury     Manuals 2/8       IASGILBERT MILLARD TFCC 3'                       Neuro Re-Ed                                                                 Ther Ex        Wrist isometrics         isometrics                                                        Ther Activity                        Gait Training                        Modalities        [facility]        [place]

## 2021-02-12 ENCOUNTER — OFFICE VISIT (OUTPATIENT)
Dept: OCCUPATIONAL THERAPY | Facility: CLINIC | Age: 56
End: 2021-02-12
Payer: COMMERCIAL

## 2021-02-12 DIAGNOSIS — M25.532 LEFT WRIST PAIN: Primary | ICD-10-CM

## 2021-02-12 PROCEDURE — 97035 APP MDLTY 1+ULTRASOUND EA 15: CPT

## 2021-02-12 PROCEDURE — 97110 THERAPEUTIC EXERCISES: CPT

## 2021-02-12 PROCEDURE — 97140 MANUAL THERAPY 1/> REGIONS: CPT

## 2021-02-12 NOTE — PROGRESS NOTES
Daily Note     Today's date: 2021  Patient name: Abbey Wild  : 1965  MRN: [id number]  Referring provider: Kris Beatty DO  Dx:   Encounter Diagnosis     ICD-10-CM    1  Left wrist pain  M25 532                   Subjective: As long as I don't move, I don't have pain      Objective: See treatment diary below      Assessment: Tolerated treatment well  Patient exhibited good technique with therapeutic exercises  Tolerated isometrics well  Fabricated and issued foam strap wrist widget      Plan: Continue per plan of care  Precautions:  Ventricular septal defect    Possible TFCC injury     Manuals       IASTM  10'      KT TFCC 3'               Foam wrist widget  5'      Neuro Re-Ed                                                                 Ther Ex         Wrist isometrics  10" x 10 all planes       isometrics  Pink ball 10" x 10                                                      Ther Activity                        Gait Training                        Modalities        MHP  5'      US 3 3MHz, 50%, 0 8w/cm2  8'

## 2021-02-16 ENCOUNTER — APPOINTMENT (OUTPATIENT)
Dept: OCCUPATIONAL THERAPY | Facility: CLINIC | Age: 56
End: 2021-02-16
Payer: COMMERCIAL

## 2021-02-17 ENCOUNTER — OFFICE VISIT (OUTPATIENT)
Dept: OCCUPATIONAL THERAPY | Facility: CLINIC | Age: 56
End: 2021-02-17
Payer: COMMERCIAL

## 2021-02-17 DIAGNOSIS — M25.532 LEFT WRIST PAIN: Primary | ICD-10-CM

## 2021-02-17 PROCEDURE — 97140 MANUAL THERAPY 1/> REGIONS: CPT

## 2021-02-17 PROCEDURE — 97035 APP MDLTY 1+ULTRASOUND EA 15: CPT

## 2021-02-17 PROCEDURE — 97110 THERAPEUTIC EXERCISES: CPT

## 2021-02-17 NOTE — PROGRESS NOTES
Daily Note     Today's date: 2021  Patient name: Sinan Li  : 1965  MRN: [id number]  Referring provider: Debra Burrell DO  Dx:   Encounter Diagnosis     ICD-10-CM    1  Left wrist pain  M25 532                   Subjective: I've been taking ibuprofen and wearing the splint all the time and it seems a little less swollen      Objective: See treatment diary below      Assessment: Tolerated treatment well  Patient exhibited good technique with therapeutic exercises  Left wrist circumference decreased 0 2cm      Plan: Continue per plan of care  Precautions:  Ventricular septal defect    Possible TFCC injury     Manuals      IASTM  10' 12'     KT TFCC 3'               Foam wrist widget  5'      Neuro Re-Ed                                                                 Ther Ex        Wrist isometrics  10" x 10 all planes 10"x15 all planes      isometrics  Pink ball 10" x 10 Pink Ball 10" x 10                                                     Ther Activity                        Gait Training                        Modalities       MHP  5' 5'     US 3 3MHz, 50%, 0 8w/cm2  8' 8'

## 2021-02-19 ENCOUNTER — TRANSCRIBE ORDERS (OUTPATIENT)
Dept: MAMMOGRAPHY | Facility: CLINIC | Age: 56
End: 2021-02-19

## 2021-02-19 ENCOUNTER — APPOINTMENT (OUTPATIENT)
Dept: OCCUPATIONAL THERAPY | Facility: CLINIC | Age: 56
End: 2021-02-19
Payer: COMMERCIAL

## 2021-02-19 ENCOUNTER — HOSPITAL ENCOUNTER (OUTPATIENT)
Dept: MAMMOGRAPHY | Facility: CLINIC | Age: 56
Discharge: HOME | End: 2021-02-19
Payer: COMMERCIAL

## 2021-02-19 ENCOUNTER — TELEPHONE (OUTPATIENT)
Dept: ULTRASOUND IMAGING | Facility: CLINIC | Age: 56
End: 2021-02-19

## 2021-02-19 ENCOUNTER — HOSPITAL ENCOUNTER (OUTPATIENT)
Dept: ULTRASOUND IMAGING | Facility: CLINIC | Age: 56
Discharge: HOME | End: 2021-02-19
Payer: COMMERCIAL

## 2021-02-19 VITALS — HEIGHT: 62 IN | WEIGHT: 150 LBS | BODY MASS INDEX: 27.6 KG/M2

## 2021-02-19 DIAGNOSIS — R92.8 ABNORMAL MAMMOGRAM: ICD-10-CM

## 2021-02-19 DIAGNOSIS — R92.8 ABNORMAL MAMMOGRAM: Primary | ICD-10-CM

## 2021-02-19 PROCEDURE — 77065 DX MAMMO INCL CAD UNI: CPT

## 2021-02-19 PROCEDURE — 76642 ULTRASOUND BREAST LIMITED: CPT

## 2021-02-19 PROCEDURE — G0279 TOMOSYNTHESIS, MAMMO: HCPCS

## 2021-02-23 ENCOUNTER — OFFICE VISIT (OUTPATIENT)
Dept: OCCUPATIONAL THERAPY | Facility: CLINIC | Age: 56
End: 2021-02-23
Payer: COMMERCIAL

## 2021-02-23 DIAGNOSIS — M25.532 LEFT WRIST PAIN: Primary | ICD-10-CM

## 2021-02-23 PROCEDURE — 97110 THERAPEUTIC EXERCISES: CPT

## 2021-02-23 PROCEDURE — 97035 APP MDLTY 1+ULTRASOUND EA 15: CPT

## 2021-02-23 PROCEDURE — 97140 MANUAL THERAPY 1/> REGIONS: CPT

## 2021-02-23 NOTE — PROGRESS NOTES
OT Re-Evaluation     Today's date: 2021  Patient name: Ratna Britton  : 1965  MRN: [id number]  Referring provider: Maki Meza DO  Dx:   Encounter Diagnosis     ICD-10-CM    1  Left wrist pain  M25 532                   Assessment  Assessment details: This is a 60year old, right hand dominant female being seen for pain in the left wrist with an onset of 2020 following a fall  Patient has soft tissue deformity on ulnar side of the left wrist   Pain with TFCC squeeze and loading  Pain with weight bearing into the wrist  AROM of the wrist is decreased and painful  Digit ROM is WNL  Sensation is intact  Strength is impaired due to pain  Patient may benefit from further testing to rule out TFCC injury  Will begin OT 2x/wk x 4 weeks to reduce pain  21:  Patient has been seen 4 times this treatment period  Her wrist swelling and pain has decreased  Improved weight bearing on the left wrist   She still has pain with provocative testing of the TFCC and triquetrum, but is not as exquisitely painful  She is wearing the wrist brace at all times except to perform exercises  Will advance strengthening program and begin to wean from splint as tolerated  Continue OT 2x/wk x 4 more weeks  Impairments: activity intolerance, impaired physical strength, lacks appropriate home exercise program, pain with function and weight-bearing intolerance  Other impairment: soft tissue deformity ulnar aspect of wrist  Functional limitations: Pain with grasping and resisted wrist motion  Symptom irritability: highBarriers to therapy: Possible TFCC or other ligamentous injury  Understanding of Dx/Px/POC: excellent   Prognosis: fair    Goals  STGs ( 4 weeks)  1  Patient will be independent in HEP to reduce pain in the left wrist and increase strength  ON GOING  2  Patient will report a 2 level decrease in left wrist pain during activities  MET  3    Patient will demonstrate AROM of the left wrist WNL without pain  MET  LTGs ( 8-12 weeks)  1  Patient will be independent in HEP to maintain left wrist and hand strength at discharge  ON GOING  2  Patient will demonstrate 5/5 wrist and forearm strength to be MI for home management tasks  NOT MET  3  Patient will demonstrate left hand  and pinch strength within 30% of the right hand to be MI for all daily tasks  NOT MET    Plan  Plan details: 21:  Continue OT 2x/wk x 8 weeks  Advance strengthening program  Patient would benefit from: custom splinting, OT eval and skilled occupational therapy  Planned modality interventions: ultrasound, thermotherapy: hydrocollator packs and cryotherapy  Planned therapy interventions: activity modification, compression, graded activity, graded exercise, home exercise program, therapeutic exercise, therapeutic activities, strengthening, patient education, orthotic fitting/training, Telles taping and manual therapy  Frequency: 2x week  Duration in weeks: 12  Plan of Care beginning date: 2021  Plan of Care expiration date: 2021  Treatment plan discussed with: patient        Subjective Evaluation    History of Present Illness  Onset date: 2020  Mechanism of injury: Patient reports "bump" on wrist since early 2020  She reports she did have a fall in December, but did not recall injuring the wrist  She reports pain in the left wrist at the end of the day  She has tried wearing a wrist splint, but her pain has persisted  Patient wears the wrist brace prn during the day  21: My wrist is a little less swollen and sore  I've been wearing the splint all the time    " I moved some table and chairs and then the pain was a 10/10 "          Recurrent probem    Quality of life: good    Pain  Current pain rating: 3  At best pain ratin  At worst pain ratin  Location: Ulnar side of the wrist  Quality: sharp and throbbing  Relieving factors: medications, ice and heat (Ibuprofen prn; wrist brace)  Aggravating factors: lifting (grasping, particularly with middle, ring, small fingers)  Progression: improved    Social Support  Lives with: spouse, young children and adult children    Employment status: working (Part time at [employer] )  Hand dominance: right      Diagnostic Tests  No diagnostic tests performed  Treatments  Treatments tried: wrist brace  Current treatment: occupational therapy  Patient Goals  Patient goals for therapy: decreased edema, decreased pain, increased motion, increased strength, independence with ADLs/IADLs and return to sport/leisure activities  Patient goal: Be able to do dishes without pain        Objective     Observations     Left Wrist/Hand   Positive for deformity  Additional Observation Details  2/8/21:  Soft deformity on lateral aspect of wrist distal to head of ulna    2/23/21:  Soft deformity less pronounced    Tenderness     Left Wrist/Hand   Tenderness in the TFCC and triquetrum  No tenderness in the sixth dorsal compartment, distal radioulnar joint, hook of hamate, lunate, pisiform and capitate  Neurological Testing     Sensation     Wrist/Hand   Left   Intact: light touch    Active Range of Motion     Left Wrist   Wrist flexion: 70 degrees WFL  Wrist extension: 70 degrees WFL  Radial deviation: 35 degrees WFL  Ulnar deviation: 30 degrees WFL      Additional Active Range of Motion Details  2/8/21: digit ROM is WNL    2/23/21:  AROM is now WNL without pain        Strength/Myotome Testing     Left Wrist/Hand   Wrist extension: 5  Wrist flexion: 4  Radial deviation: 5  Ulnar deviation: 5     (2nd hand position)     Trial 1: 45    Right Wrist/Hand      (2nd hand position)     Trial 1: 73    Additional Strength Details  2/8/21: Pain with resistive wrist flexion  Mild discomfort with ulnar wrist deviation  2/23/21:   improved 10 lbs and was pain free    Still has pain with resisted wrist flexion, but not ulnar deviation    Tests     Left Wrist/Hand   Negative TFCC load  Swelling     Left Wrist/Hand   Circumference wrist: 14 8 cm    Additional Swelling Details  2/23/21:  Left wrist edema decreased 0 4 cm    Right Wrist/Hand   Circumference wrist: 14 5 cm             Precautions:  Ventricular septal defect    Possible TFCC injury     Manuals 2/8 2/12 2/17 2/23    IASTM  10' 12' 10'    KT TFCC 3'   2'            Foam wrist widget  5'      Neuro Re-Ed                                                                 Ther Ex   2/12 2/17 2/23    Wrist isometrics  10" x 10 all planes 10"x15 all planes YFB x 20 all planes     isometrics  Pink ball 10" x 10 Pink Ball 10" x 10 Pink ball 10" x 10                                                    Ther Activity                        Gait Training                        Modalities  2/12 2/17 2/23    MHP  5' 5' 5'    US 3 3MHz, 50%, 0 8w/cm2  8' 8' 8'

## 2021-02-26 ENCOUNTER — OFFICE VISIT (OUTPATIENT)
Dept: OCCUPATIONAL THERAPY | Facility: CLINIC | Age: 56
End: 2021-02-26
Payer: COMMERCIAL

## 2021-02-26 DIAGNOSIS — M25.532 LEFT WRIST PAIN: Primary | ICD-10-CM

## 2021-02-26 PROCEDURE — 97140 MANUAL THERAPY 1/> REGIONS: CPT

## 2021-02-26 PROCEDURE — 97110 THERAPEUTIC EXERCISES: CPT

## 2021-02-26 PROCEDURE — 97035 APP MDLTY 1+ULTRASOUND EA 15: CPT

## 2021-02-26 NOTE — PROGRESS NOTES
Daily Note     Today's date: 2021  Patient name: Stuart Ring  : 1965  MRN: [id number]  Referring provider: Carito Plasencia DO  Dx:   Encounter Diagnosis     ICD-10-CM    1  Left wrist pain  M25 532                   Subjective: I was really sore after the last treatment      Objective: See treatment diary below      Assessment: Tolerated treatment fair  Patient demonstrated fatigue post treatment  Patient had pain after reevaluation last visit  She has pain with radial deviation and wrist curls      Plan: Continue per plan of care  Precautions:  Ventricular septal defect    Possible TFCC injury     Manuals    IASTM  10' 12' 10' 10'   KT TFCC 3'   2' 2'           Foam wrist widget  5'      Neuro Re-Ed                                                                 Ther Ex      Wrist isometrics  10" x 10 all planes 10"x15 all planes YFB x 20 all planes YFB all planes    isometrics  Pink ball 10" x 10 Pink Ball 10" x 10 Pink ball 10" x 10 Pink ball 10" x 10   HG     15# x 10   Wrist curls e/f, dt     Lg isotube x 10 ea   P/S curls     Lg isotube x 10                           Ther Activity                        Gait Training                        Modalities     MHP  5' 5' 5' 5'   US 3 3MHz, 50%, 0 8w/cm2  8' 8' 8' 8'

## 2021-03-02 ENCOUNTER — OFFICE VISIT (OUTPATIENT)
Dept: OCCUPATIONAL THERAPY | Facility: CLINIC | Age: 56
End: 2021-03-02
Payer: COMMERCIAL

## 2021-03-02 DIAGNOSIS — M25.532 LEFT WRIST PAIN: Primary | ICD-10-CM

## 2021-03-02 PROCEDURE — 97140 MANUAL THERAPY 1/> REGIONS: CPT

## 2021-03-02 PROCEDURE — 97110 THERAPEUTIC EXERCISES: CPT

## 2021-03-02 NOTE — PROGRESS NOTES
Daily Note     Today's date: 3/2/2021  Patient name: Stuart Ring  : 1965  MRN: [id number]  Referring provider: Carito Plasencia DO  Dx:   Encounter Diagnosis     ICD-10-CM    1  Left wrist pain  M25 532                   Subjective: "I don't really have any pain today"      Objective: See treatment diary below      Assessment: Tolerated treatment well  Grade up to 1# DB wrist curls and p/s this session w/pt tolerating well  Next session grade up to RFB as able  Pt w/o report of pain post session  Patient would benefit from continued OT      Plan: Continue per plan of care  Precautions:  Ventricular septal defect    Possible TFCC injury     Manuals 3/02 2/12 2/17 2/23 2/26   IASTM 10' 10' 12' 10' 10'   KT TFCC 3'   2' 2'           Foam wrist widget  5'      Neuro Re-Ed                                                                 Ther Ex 3/02   2/17 2/23 2/26   Wrist isometrics YFB x20 all planes RFB x20 all planes 10"x15 all planes YFB x 20 all planes YFB all planes    isometrics Pink Ball 10"x10 Pink ball 10" x 10 Pink Ball 10" x 10 Pink ball 10" x 10 Pink ball 10" x 10   HG 15#x10    15# x 10   Wrist curls e/f, dt 1#DB x20    Lg isotube x 10 ea   P/S curls 1# DB x15    Lg isotube x 10   YRC D1-3 x20       RRC D4-5 x20               Ther Activity                        Gait Training                        Modalities 3/02 2/12 2/17 2/23 2/26   MHP 5' 5' 5' 5' 5'   US 3 3MHz, 50%, 0 8w/cm2  8' 8' 8' 8'

## 2021-03-05 ENCOUNTER — OFFICE VISIT (OUTPATIENT)
Dept: OCCUPATIONAL THERAPY | Facility: CLINIC | Age: 56
End: 2021-03-05
Payer: COMMERCIAL

## 2021-03-05 DIAGNOSIS — M25.532 LEFT WRIST PAIN: Primary | ICD-10-CM

## 2021-03-05 PROCEDURE — 97140 MANUAL THERAPY 1/> REGIONS: CPT

## 2021-03-05 PROCEDURE — 97035 APP MDLTY 1+ULTRASOUND EA 15: CPT

## 2021-03-05 PROCEDURE — 97110 THERAPEUTIC EXERCISES: CPT

## 2021-03-05 NOTE — PROGRESS NOTES
Daily Note     Today's date: 3/5/2021  Patient name: Darren Dobson  : 1965  MRN: [id number]  Referring provider: Sherryle Mare, DO  Dx:   Encounter Diagnosis     ICD-10-CM    1  Left wrist pain  M25 532                   Subjective: I didn't put on my wrist brace and I got the pain again  It's not as bad as before, but it's still there      Objective: See treatment diary below      Assessment: Tolerated treatment well  Patient exhibited good technique with therapeutic exercises  Overall decrease in pain and wrist edema, but still painful with compression of TFCC      Plan: Therapy on hold pending evaluation by hand surgeon     Precautions:  Ventricular septal defect    Possible TFCC injury     Manuals 3/02 3/5 2/17 2/23 2/26   IASTM 10' 10' 12' 10' 10'   KT TFCC 3' 2'  2' 2'           Foam wrist widget        Neuro Re-Ed                                                                 Ther Ex 3/02  3/5 2/17 2/23 2/26   Wrist isometrics YFB x20 all planes RFB x20 all planes 10"x15 all planes YFB x 20 all planes YFB all planes    isometrics Pink Ball 10"x10 Pink ball 10" x 10 Pink Ball 10" x 10 Pink ball 10" x 10 Pink ball 10" x 10   HG 15#x10    15# x 10   Wrist curls e/f, dt 1#DB x20 1# x20   Lg isotube x 10 ea   P/S curls 1# DB x15 1# x 20   Lg isotube x 10   CP D1-3 Yx20       CP D4-5 Rx20 Y x 10              Ther Activity                        Gait Training                        Modalities 3/02 3/5 2/17 2/23 2/26   MHP 5' 5' 5' 5' 5'   US 3 3MHz, 50%, 0 8w/cm2  8' 8' 8' 8'

## 2021-03-09 ENCOUNTER — APPOINTMENT (OUTPATIENT)
Dept: OCCUPATIONAL THERAPY | Facility: CLINIC | Age: 56
End: 2021-03-09
Payer: COMMERCIAL

## 2021-03-12 ENCOUNTER — APPOINTMENT (OUTPATIENT)
Dept: OCCUPATIONAL THERAPY | Facility: CLINIC | Age: 56
End: 2021-03-12
Payer: COMMERCIAL

## 2021-03-16 ENCOUNTER — OFFICE VISIT (OUTPATIENT)
Dept: OBGYN CLINIC | Facility: CLINIC | Age: 56
End: 2021-03-16
Payer: COMMERCIAL

## 2021-03-16 VITALS
WEIGHT: 156 LBS | BODY MASS INDEX: 28.71 KG/M2 | SYSTOLIC BLOOD PRESSURE: 130 MMHG | HEIGHT: 62 IN | DIASTOLIC BLOOD PRESSURE: 90 MMHG | HEART RATE: 75 BPM

## 2021-03-16 DIAGNOSIS — S63.655A SPRAIN OF METACARPOPHALANGEAL (MCP) JOINT OF LEFT RING FINGER, INITIAL ENCOUNTER: Primary | ICD-10-CM

## 2021-03-16 DIAGNOSIS — S63.592A SPRAIN OF ULNAR COLLATERAL LIGAMENT OF LEFT WRIST, INITIAL ENCOUNTER: ICD-10-CM

## 2021-03-16 PROCEDURE — 20605 DRAIN/INJ JOINT/BURSA W/O US: CPT | Performed by: ORTHOPAEDIC SURGERY

## 2021-03-16 PROCEDURE — 20600 DRAIN/INJ JOINT/BURSA W/O US: CPT | Performed by: ORTHOPAEDIC SURGERY

## 2021-03-16 PROCEDURE — 99213 OFFICE O/P EST LOW 20 MIN: CPT | Performed by: ORTHOPAEDIC SURGERY

## 2021-03-16 RX ORDER — LIDOCAINE HYDROCHLORIDE 10 MG/ML
2.5 INJECTION, SOLUTION INFILTRATION; PERINEURAL
Status: COMPLETED | OUTPATIENT
Start: 2021-03-16 | End: 2021-03-16

## 2021-03-16 RX ORDER — LIDOCAINE HYDROCHLORIDE 10 MG/ML
0.5 INJECTION, SOLUTION INFILTRATION; PERINEURAL
Status: COMPLETED | OUTPATIENT
Start: 2021-03-16 | End: 2021-03-16

## 2021-03-16 RX ORDER — TRIAMCINOLONE ACETONIDE 40 MG/ML
20 INJECTION, SUSPENSION INTRA-ARTICULAR; INTRAMUSCULAR
Status: COMPLETED | OUTPATIENT
Start: 2021-03-16 | End: 2021-03-16

## 2021-03-16 RX ADMIN — LIDOCAINE HYDROCHLORIDE 0.5 ML: 10 INJECTION, SOLUTION INFILTRATION; PERINEURAL at 09:53

## 2021-03-16 RX ADMIN — LIDOCAINE HYDROCHLORIDE 2.5 ML: 10 INJECTION, SOLUTION INFILTRATION; PERINEURAL at 09:53

## 2021-03-16 RX ADMIN — TRIAMCINOLONE ACETONIDE 20 MG: 40 INJECTION, SUSPENSION INTRA-ARTICULAR; INTRAMUSCULAR at 09:53

## 2021-03-16 RX ADMIN — Medication 0.05 MEQ: at 09:53

## 2021-03-16 NOTE — PROGRESS NOTES
CHIEF COMPLAINT:  Chief Complaint   Patient presents with    Left Wrist - Pain       SUBJECTIVE:  Ashley Baker is a 60y o  year old female who presents  Left wrist pain  Patient states at the end of December she had a fall on her outstretched hand  She was seen by her PCP and was sent to physical therapy  She has been working with physical therapy for last 2 months work on her wrist range of motion as well as strengthening  She continues to note pain over the ulnar aspect of her wrist as well as over the long finger MCP joint  She denies any numbness or tingling  PAST MEDICAL HISTORY:  Past Medical History:   Diagnosis Date    Altered mental status 12/2/2019    Hyperlipidemia        PAST SURGICAL HISTORY:  History reviewed  No pertinent surgical history  FAMILY HISTORY:  Family History   Problem Relation Age of Onset    Breast cancer Mother 79    Brain cancer Brother 32    No Known Problems Father     No Known Problems Daughter     No Known Problems Daughter     No Known Problems Maternal Grandmother     No Known Problems Maternal Grandfather     No Known Problems Paternal Grandmother     No Known Problems Paternal Grandfather        SOCIAL HISTORY:  Social History     Tobacco Use    Smoking status: Former Smoker    Smokeless tobacco: Never Used    Tobacco comment: 16-21 years old, <1 ppd    Substance Use Topics    Alcohol use:  Yes     Alcohol/week: 1 0 standard drinks     Types: 1 Glasses of wine per week     Frequency: Monthly or less     Drinks per session: 1 or 2    Drug use: Never       MEDICATIONS:    Current Outpatient Medications:     atorvastatin (LIPITOR) 10 mg tablet, Take 1 tablet (10 mg total) by mouth daily, Disp: 90 tablet, Rfl: 3    citalopram (CeleXA) 20 mg tablet, TAKE 1 AND 1/2 TABLETS DAILY BY MOUTH, Disp: 135 tablet, Rfl: 1    ibuprofen (MOTRIN) 200 mg tablet, Take 600 mg by mouth every 6 (six) hours as needed for mild pain, Disp: , Rfl:     ALLERGIES:  No Known Allergies    REVIEW OF SYSTEMS:  Review of Systems    ROS:   General: no fever, no chills  HEENT:  No loss of hearing or eyesight problems  Eyes:  No red eyes  Respiratory:  No coughing, shortness of breath or wheezing  Cardiovascular:  No chest pain, no palpitations  GI:  Abdomen soft nontender, denies nausea  Endocrine:  No muscle weakness, no frequent urination, no excessive thirst  Urinary:  No dysuria, no incontinence  Musculoskeletal: see HPI and PE  SKIN:  No skin rash, no dry skin  Neurological:  No headaches, no confusion  Psychiatric:  No suicide thoughts, no anxiety, no depression  Review of all other systems is negative    VITALS:  Vitals:    03/16/21 0907   BP: 130/90   Pulse: 75       LABS:  HgA1c:   Lab Results   Component Value Date    HGBA1C 6 0 (H) 01/22/2021     BMP:   Lab Results   Component Value Date    GLUCOSE 106 12/02/2019    CALCIUM 8 7 12/02/2019    K 4 9 01/22/2021    CO2 26 01/22/2021     01/22/2021    BUN 10 01/22/2021    CREATININE 0 77 01/22/2021       _____________________________________________________  PHYSICAL EXAMINATION:  General: well developed and well nourished, alert, oriented times 3 and appears comfortable  Psychiatric: Normal  HEENT: Trachea Midline, No torticollis  Pulmonary: No audible wheezing or strider  Cardiovascular: No discernable arrhythmia   Skin: No masses, erythema, lacerations, fluctation, ulcerations  Neurovascular: Sensation Intact to the Median, Ulnar, Radial Nerve, Motor Intact to the Median, Ulnar, Radial Nerve and Pulses Intact    MUSCULOSKELETAL EXAMINATION:    Left wrist   No erythema edema or ecchymosis noted, skin is warm to touch   Mild tenderness to palpation over the ulnar carpal ligament   Tenderness to palpation over the radial side of the MCP joint   Ligament is stable with testing    ___________________________________________________  STUDIES REVIEWED:  No studies reviewed         PROCEDURES PERFORMED:  Medium joint arthrocentesis: L ulnocarpal  Universal Protocol:  Consent: Verbal consent obtained  Risks and benefits: risks, benefits and alternatives were discussed  Consent given by: patient  Patient understanding: patient states understanding of the procedure being performed  Patient consent: the patient's understanding of the procedure matches consent given  Patient identity confirmed: verbally with patient    Supporting Documentation  Indications: pain   Procedure Details  Location: wrist - L ulnocarpal  Preparation: Patient was prepped and draped in the usual sterile fashion  Needle size: 25 G  Medications administered: 0 5 mL lidocaine 1 %; 20 mg triamcinolone acetonide 40 mg/mL    Dressing:  Sterile dressing applied    Patient did have a vasovagal episode after the cortisone injection of which she recovered from in the office with some water and cold towels  Small joint arthrocentesis: L ring MCP  Universal Protocol:  Consent: Verbal consent obtained  Risks and benefits: risks, benefits and alternatives were discussed  Consent given by: patient  Patient understanding: patient states understanding of the procedure being performed  Patient consent: the patient's understanding of the procedure matches consent given  Radiology Images displayed and confirmed  If images not available, report reviewed: imaging studies available  Patient identity confirmed: verbally with patient    Supporting Documentation  Indications: pain   Procedure Details  Location: ring finger - L ring MCP  Preparation: Patient was prepped and draped in the usual sterile fashion  Needle size: 25 G  Medications administered: 0 05 mEq sodium bicarbonate 8 4 %; 2 5 mL lidocaine 1 %; 20 mg triamcinolone acetonide 40 mg/mL    Dressing:  Sterile dressing applied    Patient did have a vasovagal episode after the cortisone injection of which she recovered from in the office with some water and cold towels  _____________________________________________________  ASSESSMENT/PLAN:      Diagnoses and all orders for this visit:    Sprain of metacarpophalangeal (MCP) joint of left ring finger, initial encounter  -  Patient was given a cortisone injection today  Patient did have a vasovagal episode after the cortisone injection of which she recovered from in the office with some water and cold towels  -  She can take Tylenol and ibuprofen as needed for pain  -  She will follow-up in 2 weeks for re-evaluation    Sprain of ulnar collateral ligament of left wrist, initial encounter  -  Patient was given a cortisone injection today  Patient did have a vasovagal episode after the cortisone injection of which she recovered from in the office with some water and cold towels  -  She can take Tylenol and ibuprofen as needed for pain  -  She will follow-up in 2 weeks for re-evaluation        Follow Up:  Return in about 2 weeks (around 3/30/2021)  Work/school status:   no restrictions    To Do Next Visit:  Re-evaluation of current issue      Scribe Attestation    I,:  Mami Linares PA-C am acting as a scribe while in the presence of the attending physician :       I,:  Jes Nance MD personally performed the services described in this documentation    as scribed in my presence :           Portions of the record may have been created with voice recognition software  Occasional wrong word or "sound a like" substitutions may have occurred due to the inherent limitations of voice recognition software  Read the chart carefully and recognize, using context, where substitutions have occurred

## 2021-03-30 ENCOUNTER — OFFICE VISIT (OUTPATIENT)
Dept: OBGYN CLINIC | Facility: CLINIC | Age: 56
End: 2021-03-30
Payer: COMMERCIAL

## 2021-03-30 VITALS
BODY MASS INDEX: 28.71 KG/M2 | SYSTOLIC BLOOD PRESSURE: 129 MMHG | WEIGHT: 156 LBS | DIASTOLIC BLOOD PRESSURE: 83 MMHG | HEIGHT: 62 IN | HEART RATE: 79 BPM

## 2021-03-30 DIAGNOSIS — S63.592A SPRAIN OF ULNAR COLLATERAL LIGAMENT OF LEFT WRIST, INITIAL ENCOUNTER: Primary | ICD-10-CM

## 2021-03-30 PROCEDURE — 3008F BODY MASS INDEX DOCD: CPT | Performed by: ORTHOPAEDIC SURGERY

## 2021-03-30 PROCEDURE — 1036F TOBACCO NON-USER: CPT | Performed by: ORTHOPAEDIC SURGERY

## 2021-03-30 PROCEDURE — 99213 OFFICE O/P EST LOW 20 MIN: CPT | Performed by: ORTHOPAEDIC SURGERY

## 2021-03-30 NOTE — PROGRESS NOTES
CHIEF COMPLAINT:  Chief Complaint   Patient presents with    Left Wrist - Follow-up       SUBJECTIVE:  Abbey Wild is a 60y o  year old  female who presents to the office for a follow up after left ring MCP joint injection and left ulnocarpal ligament CSI both provided 3/16/2021  Patient previously reported that at the end of December she had a fall onto her outstretched left hand  Patient was previously seen by her PCP who sent her to therapy to work on motion and strengthening for 2 months  Patient previously reported having continued ulnar-sided wrist pain as well as ring finger MCP joint pain  Today patient states that she is no longer having pain at the MCP joint of her left ring finger  Patient states that since after the cortisone steroid injection the pain in her left wrist did resolve although she has been doing a lot of stretching up against a wall due to a charley horse for the past few days and she has had increased pain  Patient states that she had difficulty lifting a frying pan  PAST MEDICAL HISTORY:  Past Medical History:   Diagnosis Date    Altered mental status 12/2/2019    Hyperlipidemia        PAST SURGICAL HISTORY:  History reviewed  No pertinent surgical history  FAMILY HISTORY:  Family History   Problem Relation Age of Onset    Breast cancer Mother 79    Brain cancer Brother 32    No Known Problems Father     No Known Problems Daughter     No Known Problems Daughter     No Known Problems Maternal Grandmother     No Known Problems Maternal Grandfather     No Known Problems Paternal Grandmother     No Known Problems Paternal Grandfather        SOCIAL HISTORY:  Social History     Tobacco Use    Smoking status: Former Smoker    Smokeless tobacco: Never Used    Tobacco comment: 16-21 years old, <1 ppd    Substance Use Topics    Alcohol use:  Yes     Alcohol/week: 1 0 standard drinks     Types: 1 Glasses of wine per week     Frequency: Monthly or less Drinks per session: 1 or 2    Drug use: Never       MEDICATIONS:    Current Outpatient Medications:     atorvastatin (LIPITOR) 10 mg tablet, Take 1 tablet (10 mg total) by mouth daily, Disp: 90 tablet, Rfl: 3    citalopram (CeleXA) 20 mg tablet, TAKE 1 AND 1/2 TABLETS DAILY BY MOUTH, Disp: 135 tablet, Rfl: 1    ibuprofen (MOTRIN) 200 mg tablet, Take 600 mg by mouth every 6 (six) hours as needed for mild pain, Disp: , Rfl:     ALLERGIES:  No Known Allergies    REVIEW OF SYSTEMS:  Review of Systems   Constitutional: Negative for chills, fever and unexpected weight change  HENT: Negative for hearing loss, nosebleeds and sore throat  Eyes: Negative for pain, redness and visual disturbance  Respiratory: Negative for cough, shortness of breath and wheezing  Cardiovascular: Negative for chest pain, palpitations and leg swelling  Gastrointestinal: Negative for abdominal pain, nausea and vomiting  Endocrine: Negative for polydipsia and polyuria  Genitourinary: Negative for dysuria and hematuria  Skin: Negative for rash and wound  Neurological: Negative for dizziness and headaches  Psychiatric/Behavioral: Negative for decreased concentration, dysphoric mood and suicidal ideas  The patient is not nervous/anxious          VITALS:  Vitals:    03/30/21 0942   BP: 129/83   Pulse: 79       LABS:  HgA1c:   Lab Results   Component Value Date    HGBA1C 6 0 (H) 01/22/2021     BMP:   Lab Results   Component Value Date    GLUCOSE 106 12/02/2019    CALCIUM 8 7 12/02/2019    K 4 9 01/22/2021    CO2 26 01/22/2021     01/22/2021    BUN 10 01/22/2021    CREATININE 0 77 01/22/2021       _____________________________________________________  PHYSICAL EXAMINATION:  General: well developed and well nourished, alert, oriented times 3 and appears comfortable  Psychiatric: Normal  HEENT: Trachea Midline, No torticollis  Pulmonary: No audible wheezing or strider  Cardiovascular: No discernable arrhythmia   Skin: No masses, erythema, lacerations, fluctation, ulcerations  Neurovascular: Sensation Intact to the Median, Ulnar, Radial Nerve, Motor Intact to the Median, Ulnar, Radial Nerve and Pulses Intact    MUSCULOSKELETAL EXAMINATION:  Left wrist   Tender to palpation over the ulnocarpal ligament   Pain with motion of the wrist   Pain with ligamentous testing       Left ring finger   No swelling erythema or ecchymosis   Full motion of the MCP joint without pain    ___________________________________________________  STUDIES REVIEWED:  No studies reviewed  PROCEDURES PERFORMED:  Procedures  No Procedures performed today    _____________________________________________________  ASSESSMENT/PLAN:  S/P left ring finger MCP joint CSI  - Pain resolved     S/P left ulnocarpal ligament CSI   - Pain resolved and then returned   - MRI of the left wrist was ordered for further evaluation due to failed improvement with CSI, 8 weeks of therapy and OTC medication including Tylenol and Ibuprofen       Follow Up:  Return for MRI review          To Do Next Visit:  Re-evaluation of current issue    Scribe Attestation    I,:  Ligia Alvarez am acting as a scribe while in the presence of the attending physician :       I,:  Jose Sanders MD personally performed the services described in this documentation    as scribed in my presence :

## 2021-03-31 NOTE — PROGRESS NOTES
3/31/21:  Patient had CSI to wrist and ring finger by Dr Brendon Hobbs  She continues to have pain in the wrist and an MRI has been ordered  DC OT at this time

## 2021-04-12 ENCOUNTER — TELEPHONE (OUTPATIENT)
Dept: OBGYN CLINIC | Facility: HOSPITAL | Age: 56
End: 2021-04-12

## 2021-04-12 NOTE — TELEPHONE ENCOUNTER
Dr Kristi Kilgore    Patient was told by her insurance company [facility]  She is asking what to do now         # [phone]

## 2021-04-13 DIAGNOSIS — M25.532 LEFT WRIST PAIN: Primary | ICD-10-CM

## 2021-04-16 NOTE — TELEPHONE ENCOUNTER
Spoke to patient and let her know that an xray was ordered for her wrist, once she has the xray done we can try to get authorization for her mri again

## 2021-04-20 ENCOUNTER — HOSPITAL ENCOUNTER (OUTPATIENT)
Dept: RADIOLOGY | Facility: HOSPITAL | Age: 56
Discharge: HOME | End: 2021-04-20
Payer: COMMERCIAL

## 2021-04-20 DIAGNOSIS — M25.532 LEFT WRIST PAIN: ICD-10-CM

## 2021-04-20 PROCEDURE — 73110 X-RAY EXAM OF WRIST: CPT

## 2021-04-27 ENCOUNTER — TELEPHONE (OUTPATIENT)
Dept: OBGYN CLINIC | Facility: HOSPITAL | Age: 56
End: 2021-04-27

## 2021-04-27 NOTE — TELEPHONE ENCOUNTER
Patient sees Dr Clare Saleh    Patient called stating her xrays were completed  Patient would like to know if there's a way for her to get authorized for an MRI or if there's something she must do at all now that the xray is done      Call back # [phone]

## 2021-05-12 DIAGNOSIS — F41.8 DEPRESSION WITH ANXIETY: ICD-10-CM

## 2021-05-12 RX ORDER — CITALOPRAM 20 MG/1
TABLET ORAL
Qty: 135 TABLET | Refills: 1 | Status: SHIPPED | OUTPATIENT
Start: 2021-05-12 | End: 2022-02-28

## 2021-05-24 DIAGNOSIS — E78.2 MIXED HYPERLIPIDEMIA: ICD-10-CM

## 2021-05-24 RX ORDER — ATORVASTATIN CALCIUM 10 MG/1
10 TABLET, FILM COATED ORAL DAILY
Qty: 90 TABLET | Refills: 3 | Status: SHIPPED | OUTPATIENT
Start: 2021-05-24 | End: 2022-05-31

## 2021-06-03 ENCOUNTER — HOSPITAL ENCOUNTER (OUTPATIENT)
Dept: MRI IMAGING | Facility: HOSPITAL | Age: 56
Discharge: HOME | End: 2021-06-03
Attending: ORTHOPAEDIC SURGERY
Payer: COMMERCIAL

## 2021-06-03 DIAGNOSIS — S63.592A SPRAIN OF ULNAR COLLATERAL LIGAMENT OF LEFT WRIST, INITIAL ENCOUNTER: ICD-10-CM

## 2021-06-03 PROCEDURE — G1004 CDSM NDSC: HCPCS

## 2021-06-03 PROCEDURE — 73221 MRI JOINT UPR EXTREM W/O DYE: CPT

## 2021-06-08 ENCOUNTER — TELEPHONE (OUTPATIENT)
Dept: OBGYN CLINIC | Facility: HOSPITAL | Age: 56
End: 2021-06-08

## 2021-06-08 NOTE — TELEPHONE ENCOUNTER
Dr Clare Saleh    [phone]    Patient had her MRI done on 6/3  She is requesting the results since your next available is not until 7/2  Please advise

## 2021-06-08 NOTE — TELEPHONE ENCOUNTER
Please advise that we can try to force her on the schedule when Dr Katie Noriega gets back in the office  We would review the results in person  Thank you

## 2021-06-08 NOTE — TELEPHONE ENCOUNTER
Patient on the schedule for 6/29  She wanted to know in the meantime if she should stop PT or do any bracing?

## 2021-06-09 NOTE — TELEPHONE ENCOUNTER
Spoke to patient and advised above  She stated she is not in PT  Stated she will take tylenol and ibuprofen as recommended on the bottle and await her follow up visit   Thank you

## 2021-06-09 NOTE — TELEPHONE ENCOUNTER
She can stop PT if it is causing her significant discomfort  She can take Tylenol and anti-inflammatories as needed if not contraindicated  We like to avoid bracing at this time as it will cause significant stiffness and decreased range of motion  Thank you

## 2021-06-25 ENCOUNTER — TELEMEDICINE (OUTPATIENT)
Dept: FAMILY MEDICINE CLINIC | Facility: CLINIC | Age: 56
End: 2021-06-25
Payer: COMMERCIAL

## 2021-06-25 DIAGNOSIS — B34.9 VIRAL INFECTION, UNSPECIFIED: ICD-10-CM

## 2021-06-25 DIAGNOSIS — J01.90 ACUTE NON-RECURRENT SINUSITIS, UNSPECIFIED LOCATION: Primary | ICD-10-CM

## 2021-06-25 PROCEDURE — U0005 INFEC AGEN DETEC AMPLI PROBE: HCPCS | Performed by: FAMILY MEDICINE

## 2021-06-25 PROCEDURE — 99213 OFFICE O/P EST LOW 20 MIN: CPT | Performed by: FAMILY MEDICINE

## 2021-06-25 PROCEDURE — U0003 INFECTIOUS AGENT DETECTION BY NUCLEIC ACID (DNA OR RNA); SEVERE ACUTE RESPIRATORY SYNDROME CORONAVIRUS 2 (SARS-COV-2) (CORONAVIRUS DISEASE [COVID-19]), AMPLIFIED PROBE TECHNIQUE, MAKING USE OF HIGH THROUGHPUT TECHNOLOGIES AS DESCRIBED BY CMS-2020-01-R: HCPCS | Performed by: FAMILY MEDICINE

## 2021-06-25 RX ORDER — FLUTICASONE PROPIONATE 50 MCG
1 SPRAY, SUSPENSION (ML) NASAL DAILY
Qty: 16 G | Refills: 1 | Status: SHIPPED | OUTPATIENT
Start: 2021-06-25

## 2021-06-25 RX ORDER — AMOXICILLIN AND CLAVULANATE POTASSIUM 875; 125 MG/1; MG/1
1 TABLET, FILM COATED ORAL EVERY 12 HOURS SCHEDULED
Qty: 14 TABLET | Refills: 0 | Status: SHIPPED | OUTPATIENT
Start: 2021-06-25 | End: 2021-07-02

## 2021-06-25 NOTE — PROGRESS NOTES
Virtual Regular Visit      Assessment/Plan:    Problem List Items Addressed This Visit     None      Visit Diagnoses     Acute non-recurrent sinusitis, unspecified location    -  Primary    Relevant Medications    amoxicillin-clavulanate (AUGMENTIN) 875-125 mg per tablet    fluticasone (FLONASE) 50 mcg/act nasal spray    Viral infection, unspecified        Relevant Orders    Novel Coronavirus (Covid-19),PCR [facility] - Collected at [facility] or [facility]        Discussed various options for management including antibiotic for sinusitis vs trail of Flonase + OTC allergy medication + continued Mucinex  Pt agreeable to both  Will start Augmentin -- discussed possible ADRs including GI upset  R/o COVID  Reason for visit is   Chief Complaint   Patient presents with    Virtual Regular Visit        Encounter provider Heather Angeles DO    Provider located at [place] [address] [place] [id number]      Recent Visits  No visits were found meeting these conditions  Showing recent visits within past 7 days and meeting all other requirements  Today's Visits  Date Type Provider Dept   06/25/21 Telemedicine Dionne Solares DO [facility]    Showing today's visits and meeting all other requirements  Future Appointments  No visits were found meeting these conditions  Showing future appointments within next 150 days and meeting all other requirements       The patient was identified by name and date of birth  Jackelin Course was informed that this is a telemedicine visit and that the visit is being conducted through [address] [place] [address] Now and patient was informed that this is a secure, HIPAA-compliant platform  She agrees to proceed     My office door was closed  No one else was in the room  She acknowledged consent and understanding of privacy and security of the video platform   The patient has agreed to participate and understands they can discontinue the visit at any time     Patient is aware this is a billable service  Subjective  Alba Gregory is a 60 y o  female who presents due to cough  HPI     Last week her  had a cold -- he was tested for COVID, which was negative  5-6 day history of:   (+) nasal congestion, rhinorrhea, postnasal drip  Started with cough last night   Went to work 6/23, but left early because she was tired  Went to work yesterday  Has been taking Mucinex intermittently   Pt is not vaccinated against COVID    Past Medical History:   Diagnosis Date    Altered mental status 12/2/2019    Hyperlipidemia        No past surgical history on file  Current Outpatient Medications   Medication Sig Dispense Refill    amoxicillin-clavulanate (AUGMENTIN) 875-125 mg per tablet Take 1 tablet by mouth every 12 (twelve) hours for 7 days 14 tablet 0    atorvastatin (LIPITOR) 10 mg tablet Take 1 tablet (10 mg total) by mouth daily 90 tablet 3    citalopram (CeleXA) 20 mg tablet TAKE 1 AND 1/2 TABLETS DAILY BY MOUTH 135 tablet 1    fluticasone (FLONASE) 50 mcg/act nasal spray 1 spray into each nostril daily 16 g 1    ibuprofen (MOTRIN) 200 mg tablet Take 600 mg by mouth every 6 (six) hours as needed for mild pain       No current facility-administered medications for this visit  No Known Allergies    Review of Systems   Constitutional: Positive for fatigue  Negative for fever  HENT: Positive for congestion, postnasal drip and rhinorrhea  Negative for ear pain (popping at night) and sore throat  Respiratory: Positive for cough (dry)  Negative for chest tightness and shortness of breath (if she lays down at night without Mucinex)  Gastrointestinal: Negative for abdominal pain, diarrhea and vomiting  Neurological: Positive for headaches  Video Exam    There were no vitals filed for this visit  Physical Exam  Vitals and nursing note reviewed  Constitutional:       General: She is not in acute distress       Appearance: Normal appearance  HENT:      Head: Normocephalic and atraumatic  Pulmonary:      Effort: Pulmonary effort is normal  No respiratory distress  Neurological:      General: No focal deficit present  Mental Status: She is alert  Psychiatric:         Mood and Affect: Mood normal           I spent 8 minutes directly with the patient during this visit      VIRTUAL VISIT DISCLAIMER    Stormy Weir acknowledges that she has consented to an online visit or consultation  She understands that the online visit is based solely on information provided by her, and that, in the absence of a face-to-face physical evaluation by the physician, the diagnosis she receives is both limited and provisional in terms of accuracy and completeness  This is not intended to replace a full medical face-to-face evaluation by the physician  Stormy Weir understands and accepts these terms

## 2021-06-29 ENCOUNTER — OFFICE VISIT (OUTPATIENT)
Dept: OBGYN CLINIC | Facility: CLINIC | Age: 56
End: 2021-06-29
Payer: COMMERCIAL

## 2021-06-29 VITALS
HEART RATE: 67 BPM | WEIGHT: 147 LBS | DIASTOLIC BLOOD PRESSURE: 89 MMHG | SYSTOLIC BLOOD PRESSURE: 132 MMHG | HEIGHT: 62 IN | BODY MASS INDEX: 27.05 KG/M2

## 2021-06-29 DIAGNOSIS — M19.049 ARTHRITIS OF HAND: Primary | ICD-10-CM

## 2021-06-29 PROCEDURE — 1036F TOBACCO NON-USER: CPT | Performed by: ORTHOPAEDIC SURGERY

## 2021-06-29 PROCEDURE — 3008F BODY MASS INDEX DOCD: CPT | Performed by: ORTHOPAEDIC SURGERY

## 2021-06-29 PROCEDURE — 99213 OFFICE O/P EST LOW 20 MIN: CPT | Performed by: ORTHOPAEDIC SURGERY

## 2021-06-29 NOTE — PROGRESS NOTES
CHIEF COMPLAINT:  Chief Complaint   Patient presents with    Left Hand - Follow-up       SUBJECTIVE:  Ania Archibald is a 60y o  year old  female who presents to the office for a follow up after left ring MCP joint injection and left ulnocarpal ligament CSI both provided 3/16/2021  She states that she did get relief after the ulnar carpal cortisone injection  She obtained an MRI of the left wrist to evaluate her source of pain  Patient previously reported that at the end of December she had a fall onto her outstretched left hand  Patient was previously seen by her PCP who sent her to therapy to work on motion and strengthening for 2 months  Patient previously reported having continued ulnar-sided wrist pain as well as ring finger MCP joint pain  Today patient states that she is no longer having pain at the MCP joint of her left ring finger  Patient states that since after the cortisone steroid injection the pain in her left wrist did resolve although she has been doing a lot of stretching up against a wall due to a charley horse for the past few days and she has had increased pain  Patient states that she had difficulty lifting a frying pan  PAST MEDICAL HISTORY:  Past Medical History:   Diagnosis Date    Altered mental status 12/2/2019    Hyperlipidemia        PAST SURGICAL HISTORY:  History reviewed  No pertinent surgical history      FAMILY HISTORY:  Family History   Problem Relation Age of Onset    Breast cancer Mother 79    Brain cancer Brother 32    No Known Problems Father     No Known Problems Daughter     No Known Problems Daughter     No Known Problems Maternal Grandmother     No Known Problems Maternal Grandfather     No Known Problems Paternal Grandmother     No Known Problems Paternal Grandfather        SOCIAL HISTORY:  Social History     Tobacco Use    Smoking status: Former Smoker    Smokeless tobacco: Never Used    Tobacco comment: 16-21 years old, <1 ppd Vaping Use    Vaping Use: Never used   Substance Use Topics    Alcohol use: Yes     Alcohol/week: 1 0 standard drinks     Types: 1 Glasses of wine per week    Drug use: Never       MEDICATIONS:    Current Outpatient Medications:     amoxicillin-clavulanate (AUGMENTIN) 875-125 mg per tablet, Take 1 tablet by mouth every 12 (twelve) hours for 7 days, Disp: 14 tablet, Rfl: 0    atorvastatin (LIPITOR) 10 mg tablet, Take 1 tablet (10 mg total) by mouth daily, Disp: 90 tablet, Rfl: 3    citalopram (CeleXA) 20 mg tablet, TAKE 1 AND 1/2 TABLETS DAILY BY MOUTH, Disp: 135 tablet, Rfl: 1    fluticasone (FLONASE) 50 mcg/act nasal spray, 1 spray into each nostril daily, Disp: 16 g, Rfl: 1    ibuprofen (MOTRIN) 200 mg tablet, Take 600 mg by mouth every 6 (six) hours as needed for mild pain (Patient not taking: Reported on 6/29/2021), Disp: , Rfl:     ALLERGIES:  No Known Allergies    REVIEW OF SYSTEMS:  Review of Systems   Constitutional: Negative for chills, fever and unexpected weight change  HENT: Negative for hearing loss, nosebleeds and sore throat  Eyes: Negative for pain, redness and visual disturbance  Respiratory: Negative for cough, shortness of breath and wheezing  Cardiovascular: Negative for chest pain, palpitations and leg swelling  Gastrointestinal: Negative for abdominal pain, nausea and vomiting  Endocrine: Negative for polydipsia and polyuria  Genitourinary: Negative for dysuria and hematuria  Skin: Negative for rash and wound  Neurological: Negative for dizziness and headaches  Psychiatric/Behavioral: Negative for decreased concentration, dysphoric mood and suicidal ideas  The patient is not nervous/anxious          VITALS:  Vitals:    06/29/21 0920   BP: 132/89   Pulse: 67       LABS:  HgA1c:   Lab Results   Component Value Date    HGBA1C 6 0 (H) 01/22/2021     BMP:   Lab Results   Component Value Date    GLUCOSE 106 12/02/2019    CALCIUM 8 7 12/02/2019    K 4 9 01/22/2021    CO2 26 01/22/2021     01/22/2021    BUN 10 01/22/2021    CREATININE 0 77 01/22/2021       _____________________________________________________  PHYSICAL EXAMINATION:  General: well developed and well nourished, alert, oriented times 3 and appears comfortable  Psychiatric: Normal  HEENT: Trachea Midline, No torticollis  Pulmonary: No audible wheezing or strider  Cardiovascular: No discernable arrhythmia   Skin: No masses, erythema, lacerations, fluctation, ulcerations  Neurovascular: Sensation Intact to the Median, Ulnar, Radial Nerve, Motor Intact to the Median, Ulnar, Radial Nerve and Pulses Intact    MUSCULOSKELETAL EXAMINATION:  Left wrist   Tender to palpation over the ulnocarpal ligament, pisotriquetral joint  Pain with motion of the wrist   Pain with ligamentous testing       Left ring finger   No swelling erythema or ecchymosis   Full motion of the MCP joint without pain    ___________________________________________________  STUDIES REVIEWED:  MRI of the left wrist demonstrated pisotriquetral osteoarthritis, small cyst appreciated as well next to pisotriquetral joint      PROCEDURES PERFORMED:  Procedures  No Procedures performed today    _____________________________________________________  ASSESSMENT/PLAN:    Left pisotriquetral osteoarthritis  - patient was offered a cortisone injection today  She declined due to having her grandson with her   -she was advised to follow up at her convenience for cortisone injection  - she will follow up with us as needed    Follow Up:  Return if symptoms worsen or fail to improve        To Do Next Visit:  Re-evaluation of current issue    Scribe Attestation    I,:  Fabien Puentes PA-C am acting as a scribe while in the presence of the attending physician :       I,:  Patrick Rosario MD personally performed the services described in this documentation    as scribed in my presence :

## 2021-07-02 ENCOUNTER — TELEPHONE (OUTPATIENT)
Dept: FAMILY MEDICINE CLINIC | Facility: CLINIC | Age: 56
End: 2021-07-02

## 2021-07-02 NOTE — TELEPHONE ENCOUNTER
Patient left a message at 8:31am   She was wondering if you might be able to send something I for a yeast infection  She finished her antibiotics and now has one        Please advise

## 2021-08-02 ENCOUNTER — TELEMEDICINE (OUTPATIENT)
Dept: FAMILY MEDICINE CLINIC | Facility: CLINIC | Age: 56
End: 2021-08-02
Payer: COMMERCIAL

## 2021-08-02 DIAGNOSIS — R05.9 COUGH: ICD-10-CM

## 2021-08-02 DIAGNOSIS — U07.1 COVID-19: Primary | ICD-10-CM

## 2021-08-02 PROCEDURE — 1036F TOBACCO NON-USER: CPT | Performed by: PHYSICIAN ASSISTANT

## 2021-08-02 PROCEDURE — 99214 OFFICE O/P EST MOD 30 MIN: CPT | Performed by: PHYSICIAN ASSISTANT

## 2021-08-02 RX ORDER — DEXTROMETHORPHAN HYDROBROMIDE AND PROMETHAZINE HYDROCHLORIDE 15; 6.25 MG/5ML; MG/5ML
5 SOLUTION ORAL 4 TIMES DAILY PRN
Qty: 118 ML | Refills: 0 | Status: SHIPPED | OUTPATIENT
Start: 2021-08-02

## 2021-08-02 NOTE — PROGRESS NOTES
COVID-19 Outpatient Progress Note    Assessment/Plan:    Problem List Items Addressed This Visit     None      Visit Diagnoses     COVID-19    -  Primary    Relevant Medications    Promethazine-DM (PHENERGAN-DM) 6 25-15 mg/5 mL oral syrup    Cough        Relevant Medications    Promethazine-DM (PHENERGAN-DM) 6 25-15 mg/5 mL oral syrup         Disposition:     I recommended continued isolation until at least 24 hours have passed since recovery defined as resolution of fever without the use of fever-reducing medications AND improvement in COVID symptoms AND 10 days have passed since onset of symptoms (or 10 days have passed since date of first positive viral diagnostic test for asymptomatic patients)  Sx day 3 covid day 2, discussed supportive care, O2 monitoring, isolation protocol and ED precautions  continue follow ups     I have spent 10 minutes directly with the patient  Verification of patient location:    Patient is located in the following state in which I hold an active license [place]    Encounter provider Ernesto Townsend PA-C    Provider located at [address]  [address] [place] [id number]    Recent Visits  No visits were found meeting these conditions  Showing recent visits within past 7 days and meeting all other requirements  Today's Visits  Date Type Provider Dept   08/02/21 [facility]icine [facility]    Showing today's visits and meeting all other requirements  Future Appointments  No visits were found meeting these conditions  Showing future appointments within next 150 days and meeting all other requirements       Patient agrees to participate in a virtual check in via telephone or video visit instead of presenting to the office to address urgent/immediate medical needs  Patient is aware this is a billable service  After connecting through Telephone, the patient was identified by name and date of birth   Brandee Vázquez Greta Ackerman was informed that this was a telemedicine visit and that the exam was being conducted confidentially over secure lines  My office door was closed  No one else was in the room  Susy Fontanez acknowledged consent and understanding of privacy and security of the telemedicine visit  I informed the patient that I have reviewed her record in Epic and presented the opportunity for her to ask any questions regarding the visit today  The patient agreed to participate  It was my intent to perform this visit via video technology but the patient was not able to do a video connection so the visit was completed via audio telephone only  Subjective:   Susy Fontanez is a 60 y o  female who has been screened for COVID-19  Symptom change since last report: worsening  Patient is currently asymptomatic  Patient's symptoms include fatigue, cough and chest tightness  Patient denies fever, chills, malaise, congestion, rhinorrhea, sore throat, anosmia, loss of taste, shortness of breath, abdominal pain, nausea, vomiting, diarrhea, myalgias and headaches  Carlos Lunch has been staying home and has isolated themselves in her home  She is taking care to not share personal items and is cleaning all surfaces that are touched often, like counters, tabletops, and doorknobs using household cleaning sprays or wipes  She is wearing a mask when she leaves her room  Date of symptom onset: 7/30/2021  Date of positive COVID-19 PCR: 7/31/2021    Sx day 3 covid day 2, cough, chest tightness  Feels some SOB with excess activity, none at rest  No fevers  No GI sx  Lab Results   Component Value Date    SARSCOV2 Negative 06/25/2021     Past Medical History:   Diagnosis Date    Altered mental status 12/2/2019    Hyperlipidemia      History reviewed  No pertinent surgical history    Current Outpatient Medications   Medication Sig Dispense Refill    atorvastatin (LIPITOR) 10 mg tablet Take 1 tablet (10 mg total) by mouth daily 90 tablet 3    citalopram (CeleXA) 20 mg tablet TAKE 1 AND 1/2 TABLETS DAILY BY MOUTH 135 tablet 1    fluticasone (FLONASE) 50 mcg/act nasal spray 1 spray into each nostril daily 16 g 1    ibuprofen (MOTRIN) 200 mg tablet Take 600 mg by mouth every 6 (six) hours as needed for mild pain       Promethazine-DM (PHENERGAN-DM) 6 25-15 mg/5 mL oral syrup Take 5 mL by mouth 4 (four) times a day as needed for cough 118 mL 0     No current facility-administered medications for this visit  No Known Allergies    Review of Systems   Constitutional: Positive for fatigue  Negative for chills and fever  HENT: Negative for congestion, rhinorrhea and sore throat  Respiratory: Positive for cough and chest tightness  Negative for shortness of breath  Gastrointestinal: Negative for abdominal pain, diarrhea, nausea and vomiting  Musculoskeletal: Negative for myalgias  Neurological: Negative for headaches  Objective: There were no vitals filed for this visit  Physical Exam  Vitals and nursing note reviewed  Constitutional:       General: She is not in acute distress  Pulmonary:      Effort: Pulmonary effort is normal  No respiratory distress (cough, no SOB or sentenence dyspnea)  Neurological:      Mental Status: She is alert and oriented to person, place, and time  Psychiatric:         Mood and Affect: Mood normal          VIRTUAL VISIT DISCLAIMER    Brittanie Mckeon verbally agrees to participate in [employer]  Pt is aware that [employer] could be limited without vital signs or the ability to perform a full hands-on physical Pretty Edwards understands she or the provider may request at any time to terminate the video visit and request the patient to seek care or treatment in person

## 2021-08-10 ENCOUNTER — HOSPITAL ENCOUNTER (OUTPATIENT)
Dept: ULTRASOUND IMAGING | Facility: CLINIC | Age: 56
Discharge: HOME | End: 2021-08-10
Payer: COMMERCIAL

## 2021-08-10 ENCOUNTER — HOSPITAL ENCOUNTER (OUTPATIENT)
Dept: MAMMOGRAPHY | Facility: CLINIC | Age: 56
Discharge: HOME | End: 2021-08-10
Payer: COMMERCIAL

## 2021-08-10 VITALS — HEIGHT: 62 IN | WEIGHT: 147 LBS | BODY MASS INDEX: 27.05 KG/M2

## 2021-08-10 DIAGNOSIS — R92.8 ABNORMAL MAMMOGRAM: ICD-10-CM

## 2021-08-10 PROCEDURE — 77066 DX MAMMO INCL CAD BI: CPT

## 2021-08-10 PROCEDURE — G0279 TOMOSYNTHESIS, MAMMO: HCPCS

## 2021-08-10 PROCEDURE — 76642 ULTRASOUND BREAST LIMITED: CPT

## 2022-02-26 DIAGNOSIS — F41.8 DEPRESSION WITH ANXIETY: ICD-10-CM

## 2022-02-28 RX ORDER — CITALOPRAM 20 MG/1
TABLET ORAL
Qty: 135 TABLET | Refills: 1 | Status: SHIPPED | OUTPATIENT
Start: 2022-02-28

## 2022-08-08 ENCOUNTER — APPOINTMENT (OUTPATIENT)
Dept: LAB | Facility: CLINIC | Age: 57
End: 2022-08-08
Payer: COMMERCIAL

## 2022-08-08 DIAGNOSIS — R73.03 PRE-DIABETES: ICD-10-CM

## 2022-08-08 DIAGNOSIS — E78.2 MIXED HYPERLIPIDEMIA: ICD-10-CM

## 2022-08-08 LAB
ALBUMIN SERPL BCP-MCNC: 3.7 G/DL (ref 3.5–5)
ALP SERPL-CCNC: 80 U/L (ref 46–116)
ALT SERPL W P-5'-P-CCNC: 31 U/L (ref 12–78)
ANION GAP SERPL CALCULATED.3IONS-SCNC: 6 MMOL/L (ref 4–13)
AST SERPL W P-5'-P-CCNC: 27 U/L (ref 5–45)
BILIRUB SERPL-MCNC: 0.47 MG/DL (ref 0.2–1)
BUN SERPL-MCNC: 11 MG/DL (ref 5–25)
CALCIUM SERPL-MCNC: 9.3 MG/DL (ref 8.3–10.1)
CHLORIDE SERPL-SCNC: 108 MMOL/L (ref 96–108)
CHOLEST SERPL-MCNC: 178 MG/DL
CO2 SERPL-SCNC: 28 MMOL/L (ref 21–32)
CREAT SERPL-MCNC: 0.82 MG/DL (ref 0.6–1.3)
EST. AVERAGE GLUCOSE BLD GHB EST-MCNC: 123 MG/DL
GFR SERPL CREATININE-BSD FRML MDRD: 79 ML/MIN/1.73SQ M
GLUCOSE P FAST SERPL-MCNC: 89 MG/DL (ref 65–99)
HBA1C MFR BLD: 5.9 %
HDLC SERPL-MCNC: 51 MG/DL
LDLC SERPL CALC-MCNC: 104 MG/DL (ref 0–100)
POTASSIUM SERPL-SCNC: 3.7 MMOL/L (ref 3.5–5.3)
PROT SERPL-MCNC: 7.1 G/DL (ref 6.4–8.4)
SODIUM SERPL-SCNC: 142 MMOL/L (ref 135–147)
TRIGL SERPL-MCNC: 113 MG/DL

## 2022-08-08 PROCEDURE — 80053 COMPREHEN METABOLIC PANEL: CPT

## 2022-08-08 PROCEDURE — 36415 COLL VENOUS BLD VENIPUNCTURE: CPT

## 2022-08-08 PROCEDURE — 83036 HEMOGLOBIN GLYCOSYLATED A1C: CPT

## 2022-08-08 PROCEDURE — 80061 LIPID PANEL: CPT

## 2022-08-16 PROBLEM — F41.9 ANXIETY: Status: ACTIVE | Noted: 2022-08-16

## 2022-08-16 PROBLEM — F33.0 MILD EPISODE OF RECURRENT MAJOR DEPRESSIVE DISORDER (HCC): Status: ACTIVE | Noted: 2019-12-09

## 2022-08-17 ENCOUNTER — HOSPITAL ENCOUNTER (OUTPATIENT)
Dept: MAMMOGRAPHY | Facility: CLINIC | Age: 57
Discharge: HOME | End: 2022-08-17
Payer: COMMERCIAL

## 2022-08-17 ENCOUNTER — OFFICE VISIT (OUTPATIENT)
Dept: FAMILY MEDICINE CLINIC | Facility: CLINIC | Age: 57
End: 2022-08-17
Payer: COMMERCIAL

## 2022-08-17 VITALS
WEIGHT: 149 LBS | SYSTOLIC BLOOD PRESSURE: 118 MMHG | HEIGHT: 62 IN | HEART RATE: 64 BPM | BODY MASS INDEX: 27.42 KG/M2 | DIASTOLIC BLOOD PRESSURE: 76 MMHG | OXYGEN SATURATION: 98 %

## 2022-08-17 VITALS — BODY MASS INDEX: 27.05 KG/M2 | HEIGHT: 62 IN | WEIGHT: 147 LBS

## 2022-08-17 DIAGNOSIS — E78.2 MIXED HYPERLIPIDEMIA: ICD-10-CM

## 2022-08-17 DIAGNOSIS — Z12.31 OTHER SCREENING MAMMOGRAM: ICD-10-CM

## 2022-08-17 DIAGNOSIS — R73.03 PRE-DIABETES: ICD-10-CM

## 2022-08-17 DIAGNOSIS — Z00.00 HEALTHCARE MAINTENANCE: Primary | ICD-10-CM

## 2022-08-17 DIAGNOSIS — Q21.0 VSD (VENTRICULAR SEPTAL DEFECT): ICD-10-CM

## 2022-08-17 DIAGNOSIS — Z12.11 SCREEN FOR COLON CANCER: ICD-10-CM

## 2022-08-17 DIAGNOSIS — Z12.83 SKIN CANCER SCREENING: ICD-10-CM

## 2022-08-17 DIAGNOSIS — F33.0 MILD EPISODE OF RECURRENT MAJOR DEPRESSIVE DISORDER (HCC): ICD-10-CM

## 2022-08-17 DIAGNOSIS — F41.9 ANXIETY: ICD-10-CM

## 2022-08-17 PROCEDURE — 77063 BREAST TOMOSYNTHESIS BI: CPT

## 2022-08-17 PROCEDURE — 77067 SCR MAMMO BI INCL CAD: CPT

## 2022-08-17 PROCEDURE — 99396 PREV VISIT EST AGE 40-64: CPT | Performed by: FAMILY MEDICINE

## 2022-08-17 PROCEDURE — 3725F SCREEN DEPRESSION PERFORMED: CPT | Performed by: FAMILY MEDICINE

## 2022-08-17 RX ORDER — ATORVASTATIN CALCIUM 10 MG/1
10 TABLET, FILM COATED ORAL DAILY
Qty: 90 TABLET | Refills: 1 | Status: SHIPPED | OUTPATIENT
Start: 2022-08-17

## 2022-08-17 RX ORDER — CITALOPRAM 20 MG/1
20 TABLET ORAL DAILY
Qty: 90 TABLET | Refills: 1
Start: 2022-08-17 | End: 2022-08-17 | Stop reason: SDUPTHER

## 2022-08-17 RX ORDER — CITALOPRAM 20 MG/1
20 TABLET ORAL DAILY
Qty: 90 TABLET | Refills: 1 | Status: SHIPPED | OUTPATIENT
Start: 2022-08-17

## 2022-08-17 NOTE — PROGRESS NOTES
Taylor Avalos 1965 female MRN: [id number]      ASSESSMENT/PLAN  Problem List Items Addressed This Visit        Cardiovascular and Mediastinum    VSD (ventricular septal defect)    Relevant Orders    Echo complete w/ contrast if indicated       Other    Anxiety    Relevant Medications    citalopram (CeleXA) 20 mg tablet    Mild episode of recurrent major depressive disorder (HCC)    Relevant Medications    citalopram (CeleXA) 20 mg tablet    Mixed hyperlipidemia    Pre-diabetes      Other Visit Diagnoses     Healthcare maintenance    -  Primary    Screen for colon cancer        Relevant Orders    Cologuard    Skin cancer screening        Relevant Orders    Ambulatory Referral to Dermatology        Will update ECHO to monitor VSD  After discussion, pt planning to continue Celexa     BP WNL   Labs UTD   Pap UTD  Mammogram completed this morning    CRC DUE -- agreeable to Cologuard   Vaccinations: TDap deferred, Shingles encouraged to complete, COVID deferred  Encouraged regular physical activity, varied diet, and regular dental/eye exams     Future Appointments   Date Time Provider Jamar Britt   8/23/2023  9:30 AM MO MAMMO RBC 1 MO RBC Mammo MO RBC          SUBJECTIVE  CC: Follow-up (Lab review )      HPI:  Taylor Avalos is a 60 y o  female who presents for annual physical and lab review  History reviewed and updated as below  Pre-DM: A1c 5 9% (down from 6)   Cr 0 82/GFR 79  HLD: Lipids: Total 178, , HDL 51, ; LFTs WNL     Unsure if she should continue SSRI, or wean off    Review of Systems   Constitutional: Negative for unexpected weight change  HENT: Negative for congestion, ear pain, rhinorrhea and sore throat  Eyes: Negative for visual disturbance  Respiratory: Negative for cough and shortness of breath  Cardiovascular: Negative for chest pain, palpitations and leg swelling  Gastrointestinal: Negative for abdominal pain, constipation and diarrhea     Endocrine: Negative for polyuria  Genitourinary: Negative for dysuria  Neurological: Positive for headaches (from stress)  Negative for dizziness  Psychiatric/Behavioral: Negative for sleep disturbance  Historical Information   The patient history was reviewed and updated as follows:    Past Medical History:   Diagnosis Date    Altered mental status 12/2/2019    Hyperlipidemia      History reviewed  No pertinent surgical history  Family History   Problem Relation Age of Onset    Breast cancer Mother 79    Brain cancer Brother 32    No Known Problems Father     No Known Problems Daughter     No Known Problems Daughter     No Known Problems Maternal Grandmother     No Known Problems Maternal Grandfather     No Known Problems Paternal Grandmother     No Known Problems Paternal Grandfather       Social History   Social History     Substance and Sexual Activity   Alcohol Use Yes    Alcohol/week: 1 0 standard drink    Types: 1 Glasses of wine per week     Social History     Substance and Sexual Activity   Drug Use Never     Social History     Tobacco Use   Smoking Status Former Smoker   Smokeless Tobacco Never Used   Tobacco Comment    16-21 years old, <1 ppd        Medications:     Current Outpatient Medications:     citalopram (CeleXA) 20 mg tablet, Take 1 tablet (20 mg total) by mouth daily, Disp: 90 tablet, Rfl: 1    atorvastatin (LIPITOR) 10 mg tablet, TAKE 1 TABLET BY MOUTH EVERY DAY, Disp: 30 tablet, Rfl: 0    ibuprofen (MOTRIN) 200 mg tablet, Take 600 mg by mouth every 6 (six) hours as needed for mild pain , Disp: , Rfl:   No Known Allergies    OBJECTIVE    Vitals:   Vitals:    08/17/22 1038   BP: 118/76   Pulse: 64   SpO2: 98%   Weight: 67 6 kg (149 lb)   Height: 5' 2" (1 575 m)           Physical Exam  Vitals and nursing note reviewed  Constitutional:       General: She is not in acute distress  Appearance: Normal appearance  HENT:      Head: Normocephalic and atraumatic        Right Ear: Tympanic membrane, ear canal and external ear normal       Left Ear: Tympanic membrane, ear canal and external ear normal       Nose: Nose normal       Mouth/Throat:      Mouth: Mucous membranes are moist       Pharynx: No oropharyngeal exudate or posterior oropharyngeal erythema  Eyes:      Conjunctiva/sclera: Conjunctivae normal    Cardiovascular:      Rate and Rhythm: Normal rate and regular rhythm  Heart sounds: Murmur heard  Pulmonary:      Effort: Pulmonary effort is normal  No respiratory distress  Breath sounds: Normal breath sounds  Abdominal:      General: Bowel sounds are normal  There is no distension  Palpations: Abdomen is soft  Tenderness: There is no abdominal tenderness  Musculoskeletal:      Right lower leg: No edema  Left lower leg: No edema  Lymphadenopathy:      Cervical: No cervical adenopathy  Skin:     General: Skin is warm and dry  Neurological:      General: No focal deficit present  Mental Status: She is alert     Psychiatric:         Mood and Affect: Mood normal                     DO Landon Christie [address] [facility]   8/17/2022  10:58 AM

## 2022-09-07 LAB — COLOGUARD RESULT REPORTABLE: NEGATIVE

## 2022-09-21 ENCOUNTER — HOSPITAL ENCOUNTER (OUTPATIENT)
Dept: NON INVASIVE DIAGNOSTICS | Facility: CLINIC | Age: 57
Discharge: HOME | End: 2022-09-21
Payer: COMMERCIAL

## 2022-09-21 VITALS
HEART RATE: 64 BPM | DIASTOLIC BLOOD PRESSURE: 76 MMHG | BODY MASS INDEX: 27.42 KG/M2 | WEIGHT: 149 LBS | HEIGHT: 62 IN | SYSTOLIC BLOOD PRESSURE: 118 MMHG

## 2022-09-21 DIAGNOSIS — Q21.0 VSD (VENTRICULAR SEPTAL DEFECT): ICD-10-CM

## 2022-09-21 LAB
AORTIC ROOT: 3.3 CM
APICAL FOUR CHAMBER EJECTION FRACTION: 65 %
ASCENDING AORTA: 3.3 CM
AV LVOT PEAK GRADIENT: 2 MMHG
AV PEAK GRADIENT: 6 MMHG
AV REGURGITATION PRESSURE HALF TIME: 1335 MS
DOP CALC RVOT PEAK VEL: 1.91 M/S
E WAVE DECELERATION TIME: 204 MS
FRACTIONAL SHORTENING: 39 % (ref 28–44)
INTERVENTRICULAR SEPTUM IN DIASTOLE (PARASTERNAL SHORT AXIS VIEW): 1.1 CM
INTERVENTRICULAR SEPTUM: 1.1 CM (ref 0.6–1.1)
LAAS-AP2: 16.7 CM2
LAAS-AP4: 18 CM2
LEFT ATRIUM AREA SYSTOLE SINGLE PLANE A4C: 17 CM2
LEFT ATRIUM SIZE: 4.1 CM
LEFT INTERNAL DIMENSION IN SYSTOLE: 2.5 CM (ref 2.1–4)
LEFT VENTRICULAR INTERNAL DIMENSION IN DIASTOLE: 4.1 CM (ref 3.5–6)
LEFT VENTRICULAR POSTERIOR WALL IN END DIASTOLE: 1 CM
LEFT VENTRICULAR STROKE VOLUME: 51 ML
LVSV (TEICH): 51 ML
MV E'TISSUE VEL-SEP: 11 CM/S
MV PEAK A VEL: 0.76 M/S
MV PEAK E VEL: 71 CM/S
MV STENOSIS PRESSURE HALF TIME: 59 MS
MV VALVE AREA P 1/2 METHOD: 3.73 CM2
PULMONARY REGURGITATION LATE DIASTOLIC VELOCITY: 0.01 M/S
PV MEAN GRADIENT: 16 MMHG
PV MEAN VELOCITY: 190 CM/S
PV PEAK GRADIENT: 25 MMHG
PV PEAK VELOCITY: 248 CM/S
PV VTI: 70.23 CM
RIGHT ATRIAL 2D VOLUME: 26 ML
RIGHT ATRIUM AREA SYSTOLE A4C: 12.9 CM2
RIGHT VENTRICLE ID DIMENSION: 3.7 CM
SL CV AV DECELERATION TIME RETROGRADE: 4602 MS
SL CV AV PEAK GRADIENT RETROGRADE: 54 MMHG
SL CV LEFT ATRIUM LENGTH A2C: 5.3 CM
SL CV LV EF: 60
SL CV PED ECHO LEFT VENTRICLE DIASTOLIC VOLUME (MOD BIPLANE) 2D: 74 ML
SL CV PED ECHO LEFT VENTRICLE SYSTOLIC VOLUME (MOD BIPLANE) 2D: 23 ML
SL CV RVOT MAX GRADIENT: 15 MMHG

## 2022-09-21 PROCEDURE — 93306 TTE W/DOPPLER COMPLETE: CPT

## 2022-09-21 PROCEDURE — 93306 TTE W/DOPPLER COMPLETE: CPT | Performed by: INTERNAL MEDICINE

## 2022-09-23 DIAGNOSIS — Q21.0 VSD (VENTRICULAR SEPTAL DEFECT): Primary | ICD-10-CM

## 2022-12-09 DIAGNOSIS — F33.0 MILD EPISODE OF RECURRENT MAJOR DEPRESSIVE DISORDER (HCC): ICD-10-CM

## 2022-12-09 DIAGNOSIS — F41.9 ANXIETY: ICD-10-CM

## 2022-12-09 RX ORDER — CITALOPRAM 20 MG/1
TABLET ORAL
Qty: 135 TABLET | Refills: 1 | Status: SHIPPED | OUTPATIENT
Start: 2022-12-09

## 2022-12-16 ENCOUNTER — OFFICE VISIT (OUTPATIENT)
Dept: URGENT CARE | Facility: CLINIC | Age: 57
End: 2022-12-16

## 2022-12-16 VITALS
RESPIRATION RATE: 18 BRPM | WEIGHT: 150 LBS | BODY MASS INDEX: 27.44 KG/M2 | OXYGEN SATURATION: 98 % | TEMPERATURE: 97.9 F | HEART RATE: 73 BPM

## 2022-12-16 DIAGNOSIS — J02.9 SORE THROAT: ICD-10-CM

## 2022-12-16 DIAGNOSIS — R59.1 LYMPHADENOPATHY: Primary | ICD-10-CM

## 2022-12-16 LAB — S PYO AG THROAT QL: NEGATIVE

## 2022-12-16 RX ORDER — AMOXICILLIN 500 MG/1
500 CAPSULE ORAL EVERY 8 HOURS SCHEDULED
Qty: 30 CAPSULE | Refills: 0 | Status: SHIPPED | OUTPATIENT
Start: 2022-12-16 | End: 2022-12-26

## 2022-12-16 NOTE — PROGRESS NOTES
[facility]        NAME: Merle oRdas is a 59 y o  female  : 1965    MRN: [id number]  DATE: 2022  TIME: 2:31 PM    Assessment and Plan   Lymphadenopathy [R59 1]  1  Lymphadenopathy  amoxicillin (AMOXIL) 500 mg capsule      2  Sore throat  POCT rapid strepA    amoxicillin (AMOXIL) 500 mg capsule    CANCELED: Throat culture            Patient Instructions       Follow up with PCP in 3-5 days  Proceed to  ER if symptoms worsen  Chief Complaint     Chief Complaint   Patient presents with   • Swollen Glands     Started noticing swollen glands a week ago  Ears feel full, not trouble swallowing  History of Present Illness       26-year-old female is presenting today with complain of mild sore throat with swollen glands under the jawline associated with ear fullness for proximally week  Patient has been exposed to her daughter who was recently diagnosed with strep pharyngitis  Review of Systems   Review of Systems   Constitutional: Negative for activity change, appetite change, chills, fatigue and fever  HENT: Positive for sore throat  Negative for congestion, postnasal drip, rhinorrhea, sinus pressure and sinus pain  Respiratory: Negative for cough, shortness of breath and wheezing  Cardiovascular: Negative for chest pain and palpitations  Gastrointestinal: Negative for abdominal pain, diarrhea and nausea  Skin: Negative for color change and rash  Neurological: Negative for light-headedness and headaches           Current Medications       Current Outpatient Medications:   •  amoxicillin (AMOXIL) 500 mg capsule, Take 1 capsule (500 mg total) by mouth every 8 (eight) hours for 10 days, Disp: 30 capsule, Rfl: 0  •  atorvastatin (LIPITOR) 10 mg tablet, Take 1 tablet (10 mg total) by mouth daily, Disp: 90 tablet, Rfl: 1  •  citalopram (CeleXA) 20 mg tablet, TAKE 1 AND 1/2 TABLETS BY MOUTH DAILY, Disp: 135 tablet, Rfl: 1    Current Allergies     Allergies as of 12/16/2022   • (No Known Allergies)            The following portions of the patient's history were reviewed and updated as appropriate: allergies, current medications, past family history, past medical history, past social history, past surgical history and problem list      Past Medical History:   Diagnosis Date   • Altered mental status 12/2/2019   • Hyperlipidemia        History reviewed  No pertinent surgical history  Family History   Problem Relation Age of Onset   • Breast cancer Mother 79   • Brain cancer Brother 32   • No Known Problems Father    • No Known Problems Daughter    • No Known Problems Daughter    • No Known Problems Maternal Grandmother    • No Known Problems Maternal Grandfather    • No Known Problems Paternal Grandmother    • No Known Problems Paternal Grandfather          Medications have been verified  Objective   Pulse 73   Temp 97 9 °F (36 6 °C)   Resp 18   Wt 68 kg (150 lb)   SpO2 98%   BMI 27 44 kg/m²        Physical Exam     Physical Exam  Vitals and nursing note reviewed  Constitutional:       General: She is not in acute distress  Appearance: Normal appearance  She is not ill-appearing  HENT:      Head: Normocephalic and atraumatic  Right Ear: Tympanic membrane, ear canal and external ear normal       Left Ear: Tympanic membrane, ear canal and external ear normal       Nose: Nose normal       Mouth/Throat:      Mouth: Mucous membranes are moist       Pharynx: Posterior oropharyngeal erythema present  Eyes:      General: No scleral icterus  Extraocular Movements: Extraocular movements intact  Conjunctiva/sclera: Conjunctivae normal       Pupils: Pupils are equal, round, and reactive to light  Cardiovascular:      Rate and Rhythm: Normal rate and regular rhythm  Heart sounds: Murmur heard  Pulmonary:      Effort: Pulmonary effort is normal  No respiratory distress  Breath sounds: Normal breath sounds     Abdominal:      General: Bowel sounds are normal       Tenderness: There is no abdominal tenderness  There is no guarding or rebound  Musculoskeletal:         General: Normal range of motion  Cervical back: Normal range of motion  Tenderness present  Lymphadenopathy:      Cervical: Cervical adenopathy present  Skin:     General: Skin is warm  Findings: No lesion or rash  Neurological:      General: No focal deficit present  Mental Status: She is alert and oriented to person, place, and time  Cranial Nerves: No cranial nerve deficit  Coordination: Coordination normal       Gait: Gait normal    Psychiatric:         Mood and Affect: Mood normal          Behavior: Behavior normal          Thought Content:  Thought content normal          Judgment: Judgment normal

## 2022-12-16 NOTE — PATIENT INSTRUCTIONS
Adenitis   WHAT YOU NEED TO KNOW:   Adenitis is a condition that causes your lymph nodes to become swollen and tender You may also have a fever  Adenitis is a sign of infection usually caused by bacteria  DISCHARGE INSTRUCTIONS:   Call your local emergency number (911 in the [address] [place] [address],[address]) if:   You have trouble breathing or swallowing  Return to the emergency department if:   You have new or worsening redness or swelling  You develop a large, soft bump that may leak pus  Call your doctor if:   Your symptoms do not improve after 10 days of treatment  You have questions or concerns about your condition or care  Medicines: You may  need any of the following:  Antibiotics  help treat a bacterial infection  Acetaminophen  decreases pain and fever  It is available without a doctor's order  Ask how much to take and how often to take it  Follow directions  Read the labels of all other medicines you are using to see if they also contain acetaminophen, or ask your doctor or pharmacist  Acetaminophen can cause liver damage if not taken correctly  Do not use more than 4 grams (4,000 milligrams) total of acetaminophen in one day  NSAIDs , such as ibuprofen, help decrease swelling, pain, and fever  NSAIDs can cause stomach bleeding or kidney problems in certain people  If you take blood thinner medicine, always ask your healthcare provider if NSAIDs are safe for you  Always read the medicine label and follow directions  Take your medicine as directed  Contact your healthcare provider if you think your medicine is not helping or if you have side effects  Tell him of her if you are allergic to any medicine  Keep a list of the medicines, vitamins, and herbs you take  Include the amounts, and when and why you take them  Bring the list or the pill bottles to follow-up visits  Carry your medicine list with you in case of an emergency      Manage your symptoms:   Apply moist heat  on your swollen lymph nodes for 20 to 30 minutes every 2 hours or as directed  Heat helps decrease pain and swelling  You can make a moist heat pack by soaking a small towel in hot water  Let it cool until you can hold it with your bare hands  Then wring out the extra water  Place the towel in a plastic bag, and wrap the bag with a dry towel around the bag  Place the pack over your swollen lymph nodes  Elevate your head and upper back  Keep your head and upper back elevated when you rest, such as in a recliner  Place extra pillows under your head and neck when you sleep in bed  Elevation helps decrease swelling  Prevent an infection:       Wash your hands often  Wash your hands several times each day  Wash after you use the bathroom, change a child's diaper, and before you prepare or eat food  Use soap and water every time  Rub your soapy hands together, lacing your fingers  Wash the front and back of your hands, and in between your fingers  Use the fingers of one hand to scrub under the fingernails of the other hand  Wash for at least 20 seconds  Rinse with warm, running water for several seconds  Then dry your hands with a clean towel or paper towel  Use hand  that contains alcohol if soap and water are not available  Do not touch your eyes, nose, or mouth without washing your hands first          Cover a sneeze or cough  Use a tissue that covers your mouth and nose  Throw the tissue away in a trash can right away  Use the bend of your arm if a tissue is not available  Wash your hands well with soap and water or use a hand   Clean surfaces often  Clean doorknobs, countertops, cell phones, and other surfaces that are touched often  Use a disinfecting wipe, a single-use sponge, or a cloth you can wash and reuse  Use disinfecting  if you do not have wipes  You can create a disinfecting  by mixing 1 part bleach with 10 parts water  Ask about vaccines you may need    Vaccines help prevent diseases caused by some viruses and bacteria  Get the influenza (flu) vaccine as soon as recommended each year  The flu vaccine is usually available starting in September or October  Flu viruses change, so it is important to get a flu vaccine every year  Get the pneumonia vaccine if recommended  This vaccine is usually recommended every 5 years  Your provider will tell you when to get this vaccine, if needed  He or she can tell you if you should get other vaccines, and when to get them  Follow up with your doctor within 2 days: You may be referred to a dentist or need more tests  Write down your questions so you remember to ask them during your visits  © Copyright [employer] 2022 Information is for End User's use only and may not be sold, redistributed or otherwise used for commercial purposes  All illustrations and images included in CareNotes® are the copyrighted property of A D A M , Inc  or Dorcas Garcia  The above information is an  only  It is not intended as medical advice for individual conditions or treatments  Talk to your doctor, nurse or pharmacist before following any medical regimen to see if it is safe and effective for you

## 2023-02-03 ENCOUNTER — TELEPHONE (OUTPATIENT)
Dept: FAMILY MEDICINE CLINIC | Facility: CLINIC | Age: 58
End: 2023-02-03

## 2023-02-03 ENCOUNTER — CLINICAL SUPPORT (OUTPATIENT)
Dept: FAMILY MEDICINE CLINIC | Facility: CLINIC | Age: 58
End: 2023-02-03

## 2023-02-03 DIAGNOSIS — R30.0 DYSURIA: Primary | ICD-10-CM

## 2023-02-03 DIAGNOSIS — R39.9 UTI SYMPTOMS: Primary | ICD-10-CM

## 2023-02-03 LAB
SL AMB  POCT GLUCOSE, UA: NEGATIVE
SL AMB LEUKOCYTE ESTERASE,UA: ABNORMAL
SL AMB POCT BILIRUBIN,UA: NEGATIVE
SL AMB POCT BLOOD,UA: ABNORMAL
SL AMB POCT KETONES,UA: NEGATIVE
SL AMB POCT NITRITE,UA: NEGATIVE
SL AMB POCT PH,UA: 5.5
SL AMB POCT SPECIFIC GRAVITY,UA: <=1.005
SL AMB POCT URINE PROTEIN: NEGATIVE
SL AMB POCT UROBILINOGEN: ABNORMAL

## 2023-02-03 RX ORDER — SULFAMETHOXAZOLE AND TRIMETHOPRIM 800; 160 MG/1; MG/1
1 TABLET ORAL 2 TIMES DAILY
Qty: 6 TABLET | Refills: 0 | Status: SHIPPED | OUTPATIENT
Start: 2023-02-03 | End: 2023-02-06

## 2023-02-03 NOTE — TELEPHONE ENCOUNTER
Hi, my name is Cachorro Or and I usually see Doctor Sarah Deleon  I'm not sure if this is the right extension or not, but I was calling because I believe I'm starting a UTI and I was wondering if you could call in a prescription for me  I'm at work  If you get a minute to call me back, I'd appreciate it  The number is [phone]  Again, my name is Cachorro Or  Date of birth [id number]  Thank you

## 2023-02-05 LAB — BACTERIA UR CULT: ABNORMAL

## 2023-02-24 DIAGNOSIS — E78.2 MIXED HYPERLIPIDEMIA: ICD-10-CM

## 2023-02-24 RX ORDER — ATORVASTATIN CALCIUM 10 MG/1
TABLET, FILM COATED ORAL
Qty: 90 TABLET | Refills: 1 | Status: SHIPPED | OUTPATIENT
Start: 2023-02-24

## 2023-03-08 DIAGNOSIS — E78.2 MIXED HYPERLIPIDEMIA: ICD-10-CM

## 2023-03-08 RX ORDER — ATORVASTATIN CALCIUM 10 MG/1
10 TABLET, FILM COATED ORAL DAILY
Qty: 90 TABLET | Refills: 1 | Status: SHIPPED | OUTPATIENT
Start: 2023-03-08

## 2023-06-01 ENCOUNTER — RA CDI HCC (OUTPATIENT)
Dept: OTHER | Facility: HOSPITAL | Age: 58
End: 2023-06-01

## 2023-06-01 NOTE — PROGRESS NOTES
[facility]  coding opportunities       Chart reviewed, no opportunity found: CHART REVIEWED, NO OPPORTUNITY FOUND        Patients Insurance        Commercial Insurance: Tariq Erickson

## 2023-08-22 NOTE — PROGRESS NOTES
Mar Jarquin 1965 female MRN: [id number]      ASSESSMENT/PLAN  Problem List Items Addressed This Visit    None  Visit Diagnoses     Healthcare maintenance    -  Primary          Health Maintenance:   BP WNL   Update labs as above   Pap UTD  Mammogram DUE -- scheduled   CRC UTD  Vaccinations: TDap UTD, Shingles, COVID  Encouraged regular physical activity, varied diet, and regular dental/eye exams       Future Appointments   Date Time Provider [address]   8/23/2023  9:30 AM MO MAMMO RBC 1 MO RBC Mammo [facility]   8/23/2023 10:20 AM DO [facility]          SUBJECTIVE  CC: No chief complaint on file. HPI:  Mar Jarquin is a 59 y.o. female who presents for annual physical. History reviewed and updated as below. Review of Systems    Historical Information   The patient history was reviewed and updated as follows:    Past Medical History:   Diagnosis Date   • Altered mental status 12/2/2019   • Hyperlipidemia      No past surgical history on file.   Family History   Problem Relation Age of Onset   • Breast cancer Mother 79   • Brain cancer Brother 32   • No Known Problems Father    • No Known Problems Daughter    • No Known Problems Daughter    • No Known Problems Maternal Grandmother    • No Known Problems Maternal Grandfather    • No Known Problems Paternal Grandmother    • No Known Problems Paternal Grandfather       Social History   Social History     Substance and Sexual Activity   Alcohol Use Yes   • Alcohol/week: 1.0 standard drink of alcohol   • Types: 1 Glasses of wine per week     Social History     Substance and Sexual Activity   Drug Use Never     Social History     Tobacco Use   Smoking Status Former   Smokeless Tobacco Never   Tobacco Comments    12-20 years old, <1 ppd        Medications:     Current Outpatient Medications:   •  atorvastatin (LIPITOR) 10 mg tablet, Take 1 tablet (10 mg total) by mouth daily, Disp: 90 tablet, Rfl: 1  •  citalopram (CeleXA) 20 mg tablet, TAKE 1 AND 1/2 TABLETS BY MOUTH DAILY, Disp: 135 tablet, Rfl: 1  No Known Allergies    OBJECTIVE    Vitals: There were no vitals filed for this visit.         Physical Exam               Yvonne Grey DO  [address]   8/22/2023  3:39 PM

## 2023-08-23 ENCOUNTER — OFFICE VISIT (OUTPATIENT)
Dept: FAMILY MEDICINE CLINIC | Facility: CLINIC | Age: 58
End: 2023-08-23
Payer: COMMERCIAL

## 2023-08-23 ENCOUNTER — HOSPITAL ENCOUNTER (OUTPATIENT)
Dept: MAMMOGRAPHY | Facility: CLINIC | Age: 58
Discharge: HOME | End: 2023-08-23
Payer: COMMERCIAL

## 2023-08-23 VITALS — BODY MASS INDEX: 27.6 KG/M2 | HEIGHT: 62 IN | WEIGHT: 150 LBS

## 2023-08-23 VITALS
OXYGEN SATURATION: 98 % | SYSTOLIC BLOOD PRESSURE: 110 MMHG | TEMPERATURE: 97.7 F | DIASTOLIC BLOOD PRESSURE: 68 MMHG | HEART RATE: 74 BPM | BODY MASS INDEX: 28.08 KG/M2 | WEIGHT: 152.6 LBS | HEIGHT: 62 IN

## 2023-08-23 DIAGNOSIS — Z12.31 VISIT FOR SCREENING MAMMOGRAM: ICD-10-CM

## 2023-08-23 DIAGNOSIS — Z12.4 SCREENING FOR CERVICAL CANCER: ICD-10-CM

## 2023-08-23 DIAGNOSIS — Z01.419 WOMEN'S ANNUAL ROUTINE GYNECOLOGICAL EXAMINATION: Primary | ICD-10-CM

## 2023-08-23 PROCEDURE — G0145 SCR C/V CYTO,THINLAYER,RESCR: HCPCS | Performed by: FAMILY MEDICINE

## 2023-08-23 PROCEDURE — G0476 HPV COMBO ASSAY CA SCREEN: HCPCS | Performed by: FAMILY MEDICINE

## 2023-08-23 PROCEDURE — 77063 BREAST TOMOSYNTHESIS BI: CPT

## 2023-08-23 PROCEDURE — 99396 PREV VISIT EST AGE 40-64: CPT | Performed by: FAMILY MEDICINE

## 2023-08-23 PROCEDURE — 77067 SCR MAMMO BI INCL CAD: CPT

## 2023-08-23 NOTE — PROGRESS NOTES
Dar Silva 1965 female MRN: [id number]    ASSESSMENT/PLAN  Problem List Items Addressed This Visit    None  Visit Diagnoses     Women's annual routine gynecological examination    -  Primary    Screening for cervical cancer        Relevant Orders    Liquid-based pap, screening        Pap collected, benign exam   Mammogram UTD   CRC UTD   Pt defers screening labs         Future Appointments   Date Time Provider [address]   2024  9:30 AM MO MAMMO RBC 1 MO RBC Mammo MO [facility]          SUBJECTIVE  CC: Gynecologic Exam      HPI:  Dar Silva is a 59 y.o. female who presents for annual gynecologic exam.     Gynecologic History  OB History        4    Para   2    Term   2            AB   2    Living           SAB   2    IAB        Ectopic        Multiple        Live Births   2           Obstetric Comments   2 girls            No LMP recorded. Patient is postmenopausal.  Contraception: post menopausal status  Last Pap: 2019. Results were: normal  Last mammogram: Today. Results pending     Review of Systems   Respiratory:        Denies breast mass, breast pain, skin changes, nipple discharge   Genitourinary: Negative for dyspareunia, dysuria, frequency, pelvic pain, urgency, vaginal discharge and vaginal pain. Historical Information   The patient history was reviewed as follows:    Past Medical History:   Diagnosis Date   • Altered mental status 2019   • Hyperlipidemia      History reviewed. No pertinent surgical history.   Family History   Problem Relation Age of Onset   • Breast cancer Mother 79   • Brain cancer Brother 32   • No Known Problems Father    • No Known Problems Daughter    • No Known Problems Daughter    • No Known Problems Maternal Grandmother    • No Known Problems Maternal Grandfather    • No Known Problems Paternal Grandmother    • No Known Problems Paternal Grandfather       Social History       Medications:     Current Outpatient Medications:   • atorvastatin (LIPITOR) 10 mg tablet, Take 1 tablet (10 mg total) by mouth daily, Disp: 90 tablet, Rfl: 1  •  citalopram (CeleXA) 20 mg tablet, TAKE 1 AND 1/2 TABLETS BY MOUTH DAILY, Disp: 135 tablet, Rfl: 1    No Known Allergies    OBJECTIVE  Vitals:   Vitals:    08/23/23 1020   BP: 110/68   Pulse: 74   Temp: 97.7 °F (36.5 °C)   SpO2: 98%   Weight: 69.2 kg (152 lb 9.6 oz)   Height: 5' 2" (1.575 m)         Physical Exam  Vitals and nursing note reviewed. Exam conducted with a chaperone present Evlis Santos MA). Constitutional:       General: She is not in acute distress. Appearance: She is well-developed. HENT:      Head: Normocephalic and atraumatic. Pulmonary:      Effort: Pulmonary effort is normal.   Chest:      Comments: Pt defers breast exam as she had her mammogram this morning   Genitourinary:     Exam position: Lithotomy position. Pubic Area: No rash. Labia:         Right: No rash or lesion. Left: No rash or lesion. Urethra: No urethral swelling. Vagina: No vaginal discharge or bleeding. Cervix: No cervical motion tenderness or discharge. Uterus: Not tender. Adnexa:         Right: No mass, tenderness or fullness. Left: No mass, tenderness or fullness. Rectum: Normal.   Neurological:      Mental Status: She is alert.                     Yvonne Grey DO  [address] [facility]   8/23/2023  10:36 AM

## 2023-08-24 LAB
HPV HR 12 DNA CVX QL NAA+PROBE: NEGATIVE
HPV16 DNA CVX QL NAA+PROBE: NEGATIVE
HPV18 DNA CVX QL NAA+PROBE: NEGATIVE

## 2023-08-29 LAB
LAB AP GYN PRIMARY INTERPRETATION: NORMAL
Lab: NORMAL

## 2023-09-01 DIAGNOSIS — F33.0 MILD EPISODE OF RECURRENT MAJOR DEPRESSIVE DISORDER (HCC): ICD-10-CM

## 2023-09-01 DIAGNOSIS — F41.9 ANXIETY: ICD-10-CM

## 2023-09-01 RX ORDER — CITALOPRAM 20 MG/1
TABLET ORAL
Qty: 135 TABLET | Refills: 1 | Status: SHIPPED | OUTPATIENT
Start: 2023-09-01

## 2023-09-15 ENCOUNTER — OFFICE VISIT (OUTPATIENT)
Dept: URGENT CARE | Facility: CLINIC | Age: 58
End: 2023-09-15
Payer: COMMERCIAL

## 2023-09-15 VITALS
RESPIRATION RATE: 18 BRPM | HEART RATE: 95 BPM | TEMPERATURE: 99.5 F | DIASTOLIC BLOOD PRESSURE: 80 MMHG | SYSTOLIC BLOOD PRESSURE: 139 MMHG | OXYGEN SATURATION: 98 %

## 2023-09-15 DIAGNOSIS — U07.1 COVID: Primary | ICD-10-CM

## 2023-09-15 LAB
SARS-COV-2 AG UPPER RESP QL IA: POSITIVE
VALID CONTROL: ABNORMAL

## 2023-09-15 PROCEDURE — 87811 SARS-COV-2 COVID19 W/OPTIC: CPT

## 2023-09-15 PROCEDURE — 99213 OFFICE O/P EST LOW 20 MIN: CPT

## 2023-09-15 NOTE — PATIENT INSTRUCTIONS
Keep hydrated and rest as able. COVID is positive you need to quarantine (stay home) until your fever is gone for 24 hours and days 0-5. The first day of symptoms is day 0. Notify those you were in contact with of exposure from 2 days before you had symptoms to be tested if they get sick. Wear your mask and wash hands often. Follow up with your family regular doctors in 3-5 days; call them first  Go to an emergency department if symptoms worsen, especially shortness or breath, weakness, or if unable to tolerate fluid intake.

## 2023-09-15 NOTE — PROGRESS NOTES
[facility]        NAME: Ninoska Lagos is a 59 y.o. female  : 1965    MRN: [id number]  DATE: September 15, 2023  TIME: 5:30 PM    Assessment and Plan   COVID [U07.1]  1. COVID          POC COVID is positive. Continue supportive care for symptoms. Educated on return precautions to PCP or to ED. Patient Instructions       Keep hydrated and rest as able. COVID is positive you need to quarantine (stay home) until your fever is gone for 24 hours and days 0-5. The first day of symptoms is day 0. Notify those you were in contact with of exposure from 2 days before you had symptoms to be tested if they get sick. Wear your mask and wash hands often. Follow up with your family regular doctors in 3-5 days; call them first  Go to an emergency department if symptoms worsen, especially shortness or breath, weakness, or if unable to tolerate fluid intake. Chief Complaint     Chief Complaint   Patient presents with   • Fever     Congestion, fatigue x2 days. Yesterday coughing and post nasal drip. Headache today, slight temp of 100.8. History of Present Illness       Presents with sick symptoms including congestion, fatigue, cough, postnasal drip, headache, and temp to 100.8 that started 2 days ago. Denies known sick contacts. She did have COVID in  and tolerated well, just slept it off. Review of Systems   Review of Systems   Constitutional: Positive for chills, fatigue and fever. HENT: Positive for congestion and postnasal drip. Respiratory: Positive for cough. Negative for shortness of breath. Cardiovascular: Negative for chest pain. Neurological: Positive for headaches. Psychiatric/Behavioral: Negative for confusion.          Current Medications       Current Outpatient Medications:   •  atorvastatin (LIPITOR) 10 mg tablet, Take 1 tablet (10 mg total) by mouth daily, Disp: 90 tablet, Rfl: 1  •  citalopram (CeleXA) 20 mg tablet, TAKE 1 AND 1/2 TABLETS DAILY BY MOUTH, Disp: 135 tablet, Rfl: 1    Current Allergies     Allergies as of 09/15/2023   • (No Known Allergies)            The following portions of the patient's history were reviewed and updated as appropriate: allergies, current medications, past family history, past medical history, past social history, past surgical history and problem list.     Past Medical History:   Diagnosis Date   • Altered mental status 12/2/2019   • Hyperlipidemia        History reviewed. No pertinent surgical history. Family History   Problem Relation Age of Onset   • Breast cancer Mother 79   • Brain cancer Brother 32   • No Known Problems Father    • No Known Problems Daughter    • No Known Problems Daughter    • No Known Problems Maternal Grandmother    • No Known Problems Maternal Grandfather    • No Known Problems Paternal Grandmother    • No Known Problems Paternal Grandfather          Medications have been verified. Objective   /80   Pulse 95   Temp 99.5 °F (37.5 °C)   Resp 18   SpO2 98%        Physical Exam     Physical Exam  Vitals reviewed. Constitutional:       General: She is not in acute distress. Appearance: Normal appearance. HENT:      Right Ear: Tympanic membrane, ear canal and external ear normal.      Left Ear: Tympanic membrane, ear canal and external ear normal.      Nose: Nose normal.   Eyes:      Conjunctiva/sclera: Conjunctivae normal.   Cardiovascular:      Rate and Rhythm: Normal rate and regular rhythm. Pulses: Normal pulses. Heart sounds: Normal heart sounds. No murmur heard. Pulmonary:      Effort: Pulmonary effort is normal. No respiratory distress. Breath sounds: Normal breath sounds. Lymphadenopathy:      Cervical: Cervical adenopathy present. Skin:     General: Skin is warm and dry. Neurological:      General: No focal deficit present. Mental Status: She is alert and oriented to person, place, and time.    Psychiatric:         Mood and Affect: Mood normal. Behavior: Behavior normal.

## 2023-09-16 DIAGNOSIS — E78.2 MIXED HYPERLIPIDEMIA: ICD-10-CM

## 2023-09-18 RX ORDER — ATORVASTATIN CALCIUM 10 MG/1
10 TABLET, FILM COATED ORAL DAILY
Qty: 90 TABLET | Refills: 1 | Status: SHIPPED | OUTPATIENT
Start: 2023-09-18 | End: 2023-09-27 | Stop reason: SDUPTHER

## 2023-09-27 DIAGNOSIS — E78.2 MIXED HYPERLIPIDEMIA: ICD-10-CM

## 2023-09-27 RX ORDER — ATORVASTATIN CALCIUM 10 MG/1
10 TABLET, FILM COATED ORAL DAILY
Qty: 90 TABLET | Refills: 1 | Status: SHIPPED | OUTPATIENT
Start: 2023-09-27

## 2023-12-19 ENCOUNTER — TELEPHONE (OUTPATIENT)
Dept: FAMILY MEDICINE CLINIC | Facility: CLINIC | Age: 58
End: 2023-12-19

## 2023-12-19 NOTE — TELEPHONE ENCOUNTER
Started around Thanksgiving.. cough got worse.. was better beginning of December and a lot worse now.  Is using musinex, but not helping.. please advise of what Dr recommends...

## 2023-12-19 NOTE — PROGRESS NOTES
"Heydi Buckner 1965 female MRN: [id number]      ASSESSMENT/PLAN  Problem List Items Addressed This Visit          Cardiovascular and Mediastinum    VSD (ventricular septal defect)     Other Visit Diagnoses       Chronic cough    -  Primary    Relevant Medications    methylPREDNISolone 4 MG tablet therapy pack    fluticasone (FLONASE) 50 mcg/act nasal spray    Other Relevant Orders    XR chest pa & lateral          Benign lung exam, good oxygenation. Reviewed possible etiologies of chronic cough including post-nasal drip, GERD, cough-variant asthma; however, given pt's symptoms started with URI, may be more reactive airway in nature. Will start Medrol pack -- Reviewed possible ADRs including palpitations, insomnia, increased appetite mood fluctuations; not to combine with Motrin/Aleve/etc, Tylenol ok. Will also send Flonase given turbinate edema on exam, as post-nasal drip may be playing a role. Given length of symptoms, will get CXR to r/o intra-thoracic process. To call if symptoms persist/worsen.     BP borderline -- likely secondary to coughing. Encouraged to monitor at home once symptoms have resolved and f/u if persistently >140/90     Future Appointments   Date Time Provider Department Center   8/28/2024  9:30 AM [facility] 1 [facility]ammo [facility]   8/28/2024 10:20 AM DO [facility]          SUBJECTIVE  CC: Cough (Since Thanksgiving )      HPI:  Heydi Buckner is a 58 y.o. female who presents due to cough.     Since Thanksgiving, has had a cough; started with URI/post-nasal drip   If she \"sits still\" and is \"quiet\" it is fine, but talking/moving flares up cough  Symptoms have come and gone a bit over this time, but the cough never fully resolves   Feels short of breath with exertion   Has gone through 2 bottles of Mucinex, Dayquil, vitamins wtihout relief     Review of Systems   HENT:  Negative for congestion, ear pain, postnasal drip, rhinorrhea and sore throat.  " "  Respiratory:  Positive for cough and shortness of breath.        Historical Information   The patient history was reviewed and updated as follows:    Past Medical History:   Diagnosis Date    Altered mental status 12/2/2019    Hyperlipidemia      History reviewed. No pertinent surgical history.  Family History   Problem Relation Age of Onset    Breast cancer Mother 70    Brain cancer Brother 27    No Known Problems Father     No Known Problems Daughter     No Known Problems Daughter     No Known Problems Maternal Grandmother     No Known Problems Maternal Grandfather     No Known Problems Paternal Grandmother     No Known Problems Paternal Grandfather       Social History   Social History     Substance and Sexual Activity   Alcohol Use Yes    Alcohol/week: 1.0 standard drink of alcohol    Types: 1 Glasses of wine per week     Social History     Substance and Sexual Activity   Drug Use Never     Social History     Tobacco Use   Smoking Status Former   Smokeless Tobacco Never   Tobacco Comments    [age] years old, <1 ppd        Medications:     Current Outpatient Medications:     fluticasone (FLONASE) 50 mcg/act nasal spray, 1 spray into each nostril daily, Disp: 16 g, Rfl: 1    methylPREDNISolone 4 MG tablet therapy pack, Use as directed on package, Disp: 21 each, Rfl: 0    atorvastatin (LIPITOR) 10 mg tablet, Take 1 tablet (10 mg total) by mouth daily, Disp: 90 tablet, Rfl: 1    citalopram (CeleXA) 20 mg tablet, TAKE 1 AND 1/2 TABLETS DAILY BY MOUTH, Disp: 135 tablet, Rfl: 1  No Known Allergies    OBJECTIVE    Vitals:   Vitals:    12/20/23 1501 12/20/23 1515   BP: 146/92 142/90   Pulse: 86    Temp: 97.5 °F (36.4 °C)    SpO2: 98%    Weight: 68.5 kg (151 lb)    Height: 5' 2\" (1.575 m)            Physical Exam  Vitals and nursing note reviewed.   Constitutional:       General: She is not in acute distress.     Appearance: Normal appearance.   HENT:      Head: Normocephalic and atraumatic.      Right Ear: Tympanic " membrane, ear canal and external ear normal.      Left Ear: Tympanic membrane, ear canal and external ear normal.      Nose: Mucosal edema (R) and rhinorrhea present.      Mouth/Throat:      Mouth: Mucous membranes are moist.      Pharynx: No oropharyngeal exudate or posterior oropharyngeal erythema.   Eyes:      Conjunctiva/sclera: Conjunctivae normal.   Cardiovascular:      Rate and Rhythm: Normal rate and regular rhythm.   Pulmonary:      [place]: Pulmonary effort is normal. No respiratory distress.      Breath sounds: Normal breath sounds. No wheezing or rhonchi.   Abdominal:      General: Bowel sounds are normal. There is no distension.      Palpations: Abdomen is soft.      Tenderness: There is no abdominal tenderness.   Musculoskeletal:      Right lower leg: No edema.      Left lower leg: No edema.   Lymphadenopathy:      Cervical: No cervical adenopathy.   Skin:     General: Skin is warm and dry.   Neurological:      Mental Status: She is alert.      Comments: Grossly intact   Psychiatric:         Mood and Affect: Mood normal.                    Dionne Solares DO  [facility]   12/20/2023  3:18 PM

## 2023-12-20 ENCOUNTER — OFFICE VISIT (OUTPATIENT)
Dept: FAMILY MEDICINE CLINIC | Facility: CLINIC | Age: 58
End: 2023-12-20
Payer: COMMERCIAL

## 2023-12-20 ENCOUNTER — APPOINTMENT (OUTPATIENT)
Dept: RADIOLOGY | Facility: CLINIC | Age: 58
End: 2023-12-20
Payer: COMMERCIAL

## 2023-12-20 VITALS
HEIGHT: 62 IN | WEIGHT: 151 LBS | OXYGEN SATURATION: 98 % | DIASTOLIC BLOOD PRESSURE: 90 MMHG | BODY MASS INDEX: 27.79 KG/M2 | HEART RATE: 86 BPM | TEMPERATURE: 97.5 F | SYSTOLIC BLOOD PRESSURE: 142 MMHG

## 2023-12-20 DIAGNOSIS — R05.3 CHRONIC COUGH: Primary | ICD-10-CM

## 2023-12-20 DIAGNOSIS — R05.3 CHRONIC COUGH: ICD-10-CM

## 2023-12-20 DIAGNOSIS — Q21.0 VSD (VENTRICULAR SEPTAL DEFECT): ICD-10-CM

## 2023-12-20 PROCEDURE — 99214 OFFICE O/P EST MOD 30 MIN: CPT | Performed by: FAMILY MEDICINE

## 2023-12-20 PROCEDURE — 71046 X-RAY EXAM CHEST 2 VIEWS: CPT

## 2023-12-20 RX ORDER — METHYLPREDNISOLONE 4 MG/1
TABLET ORAL
Qty: 21 EACH | Refills: 0 | Status: SHIPPED | OUTPATIENT
Start: 2023-12-20

## 2023-12-20 RX ORDER — FLUTICASONE PROPIONATE 50 MCG
1 SPRAY, SUSPENSION (ML) NASAL DAILY
Qty: 16 G | Refills: 1 | Status: SHIPPED | OUTPATIENT
Start: 2023-12-20

## 2024-02-10 DIAGNOSIS — R05.3 CHRONIC COUGH: ICD-10-CM

## 2024-02-12 RX ORDER — FLUTICASONE PROPIONATE 50 MCG
SPRAY, SUSPENSION (ML) NASAL
Qty: 24 ML | Refills: 2 | Status: SHIPPED | OUTPATIENT
Start: 2024-02-12

## 2024-04-09 DIAGNOSIS — E78.2 MIXED HYPERLIPIDEMIA: ICD-10-CM

## 2024-04-09 RX ORDER — ATORVASTATIN CALCIUM 10 MG/1
10 TABLET, FILM COATED ORAL DAILY
Qty: 90 TABLET | Refills: 1 | Status: SHIPPED | OUTPATIENT
Start: 2024-04-09

## 2024-06-28 DIAGNOSIS — F41.9 ANXIETY: ICD-10-CM

## 2024-06-28 DIAGNOSIS — F33.0 MILD EPISODE OF RECURRENT MAJOR DEPRESSIVE DISORDER (HCC): ICD-10-CM

## 2024-06-28 RX ORDER — CITALOPRAM 20 MG/1
20 TABLET ORAL DAILY
Qty: 135 TABLET | Refills: 0 | Status: SHIPPED | OUTPATIENT
Start: 2024-06-28

## 2024-06-28 NOTE — TELEPHONE ENCOUNTER
Reason for call:   [x] Refill   [] Prior Auth  [] Other:     Office:   [x] PCP/Provider - Dionne Solares DO -[facility]   [] Specialty/Provider -     Medication: citalopram (CeleXA) 20 mg tablet - TAKE 1 AND 1/2 TABLETS DAILY BY MOUTH       Pharmacy: [facility]/pharmacy #[id number] - [place], [place] - [address]     Does the patient have enough for 3 days?   [x] Yes   [] No - Send as HP to POD

## 2024-08-21 ENCOUNTER — RA CDI HCC (OUTPATIENT)
Dept: OTHER | Facility: HOSPITAL | Age: 59
End: 2024-08-21

## 2024-08-21 NOTE — PROGRESS NOTES
HCC coding opportunities       Chart reviewed, no opportunity found: CHART REVIEWED, NO OPPORTUNITY FOUND        Patients Insurance        Commercial Insurance: [employer]

## 2024-08-27 NOTE — PROGRESS NOTES
Ambulatory Visit  Name: Heydi Buckner      : 1965      MRN: [id number]  Encounter Provider: Dionne Solares DO  Encounter Date: 2024   Encounter department: [facility]    Assessment & Plan   1. Healthcare maintenance  2. Anxiety  -     CBC and differential; Future  -     TSH, 3rd generation with Free T4 reflex; Future  -     citalopram (CeleXA) 20 mg tablet; Take 1 tablet (20 mg total) by mouth daily  3. Pre-diabetes  -     Comprehensive metabolic panel; Future  -     Lipid panel; Future  -     Hemoglobin A1C; Future  -     CBC and differential; Future  4. Mixed hyperlipidemia  -     Comprehensive metabolic panel; Future  -     Lipid panel; Future  -     CBC and differential; Future  5. VSD (ventricular septal defect)  -     Echo complete w/ contrast if indicated; Future; Expected date: 2024  6. Mild episode of recurrent major depressive disorder (HCC)  -     citalopram (CeleXA) 20 mg tablet; Take 1 tablet (20 mg total) by mouth daily    Health Maintenance:   BP borderline in office -- encouraged home monitoring BID x1 week and to follow up if consistently above goal. Encouraged low salt/low caffeine, good hydration, regular physical activity. Will also update ECHO to monitor VSD.   Update labs as above   Pap UTD   Mammogram UTD (completed today)   CRC UTD   Vaccinations: TDap deferred, Shingles deferred, COVID deferred  Encouraged regular physical activity, varied diet, and regular dental/eye exams        History of Present Illness     HPI    Pt presents for annual physical   Her  checked her BP at home and it was in the 180s     Review of Systems   Constitutional:  Negative for unexpected weight change.   HENT:  Negative for congestion, ear pain, rhinorrhea and sore throat.    Eyes:  Negative for visual disturbance.   Respiratory:  Negative for cough and shortness of breath.    Cardiovascular:  Negative for chest pain, palpitations and leg swelling.  "  Gastrointestinal:  Negative for abdominal pain, blood in stool, constipation and diarrhea.   Endocrine: Negative for polyuria.   Genitourinary:  Negative for dysuria and hematuria.   Neurological:  Negative for dizziness and headaches.   Psychiatric/Behavioral:  Positive for sleep disturbance (multiple awakenings due to her dog//needing to void,(+) snoring, no witnessed apneas/coughing/choking).      Medical History Reviewed by provider this encounter:  Tobacco  Allergies  Meds  Problems  Med Hx  Surg Hx  Fam Hx       Objective     /88   Pulse 78   Temp (!) 96 °F (35.6 °C)   Ht 5' 2\" (1.575 m)   Wt 70.3 kg (155 lb)   SpO2 97%   BMI 28.35 kg/m²     Physical Exam  Vitals and nursing note reviewed.   Constitutional:       General: She is not in acute distress.     Appearance: She is well-developed.   HENT:      Head: Normocephalic and atraumatic.      Right Ear: Tympanic membrane, ear canal and external ear normal.      Left Ear: Tympanic membrane, ear canal and external ear normal.      Nose: Nose normal. No rhinorrhea.      Mouth/Throat:      Mouth: Mucous membranes are moist.      Pharynx: No oropharyngeal exudate or posterior oropharyngeal erythema.   Eyes:      Conjunctiva/sclera: Conjunctivae normal.   Cardiovascular:      Rate and Rhythm: Normal rate and regular rhythm.      Heart sounds: Murmur heard.   Pulmonary:      [place]: Pulmonary effort is normal. No respiratory distress.      Breath sounds: Normal breath sounds.   Abdominal:      General: Bowel sounds are normal. There is no distension.      Palpations: Abdomen is soft.      Tenderness: There is no abdominal tenderness.   Musculoskeletal:      Right lower leg: No edema.      Left lower leg: No edema.   Lymphadenopathy:      Cervical: No cervical adenopathy.   Skin:     General: Skin is warm and dry.   Neurological:      Mental Status: She is alert.      Comments: Grossly intact   Psychiatric:         Mood and Affect: Mood " normal.       Administrative Statements

## 2024-08-28 ENCOUNTER — OFFICE VISIT (OUTPATIENT)
Dept: FAMILY MEDICINE CLINIC | Facility: CLINIC | Age: 59
End: 2024-08-28
Payer: COMMERCIAL

## 2024-08-28 ENCOUNTER — HOSPITAL ENCOUNTER (OUTPATIENT)
Dept: MAMMOGRAPHY | Facility: CLINIC | Age: 59
Discharge: HOME | End: 2024-08-28
Payer: COMMERCIAL

## 2024-08-28 VITALS
BODY MASS INDEX: 28.52 KG/M2 | OXYGEN SATURATION: 97 % | HEIGHT: 62 IN | DIASTOLIC BLOOD PRESSURE: 88 MMHG | HEART RATE: 78 BPM | WEIGHT: 155 LBS | TEMPERATURE: 96 F | SYSTOLIC BLOOD PRESSURE: 138 MMHG

## 2024-08-28 VITALS — WEIGHT: 151 LBS | BODY MASS INDEX: 27.79 KG/M2 | HEIGHT: 62 IN

## 2024-08-28 DIAGNOSIS — Q21.0 VSD (VENTRICULAR SEPTAL DEFECT): ICD-10-CM

## 2024-08-28 DIAGNOSIS — R73.03 PRE-DIABETES: ICD-10-CM

## 2024-08-28 DIAGNOSIS — Z00.00 HEALTHCARE MAINTENANCE: Primary | ICD-10-CM

## 2024-08-28 DIAGNOSIS — E78.2 MIXED HYPERLIPIDEMIA: ICD-10-CM

## 2024-08-28 DIAGNOSIS — F41.9 ANXIETY: ICD-10-CM

## 2024-08-28 DIAGNOSIS — F33.0 MILD EPISODE OF RECURRENT MAJOR DEPRESSIVE DISORDER (HCC): ICD-10-CM

## 2024-08-28 DIAGNOSIS — Z12.31 VISIT FOR SCREENING MAMMOGRAM: ICD-10-CM

## 2024-08-28 PROCEDURE — 77063 BREAST TOMOSYNTHESIS BI: CPT

## 2024-08-28 PROCEDURE — 77067 SCR MAMMO BI INCL CAD: CPT

## 2024-08-28 PROCEDURE — 99396 PREV VISIT EST AGE 40-64: CPT | Performed by: FAMILY MEDICINE

## 2024-08-28 RX ORDER — CITALOPRAM HYDROBROMIDE 20 MG/1
20 TABLET ORAL DAILY
Status: SHIPPED
Start: 2024-08-28

## 2024-09-03 ENCOUNTER — TELEPHONE (OUTPATIENT)
Dept: FAMILY MEDICINE CLINIC | Facility: CLINIC | Age: 59
End: 2024-09-03

## 2024-09-03 NOTE — TELEPHONE ENCOUNTER
LMOMTCB    ----- Message from Dionne Solares DO sent at 9/3/2024  9:15 AM EDT -----  Please let pt know mammogram is benign -- will be due for repeat screening in 1 year.

## 2024-09-04 ENCOUNTER — APPOINTMENT (OUTPATIENT)
Dept: LAB | Facility: CLINIC | Age: 59
End: 2024-09-04
Payer: COMMERCIAL

## 2024-09-04 DIAGNOSIS — R73.03 PRE-DIABETES: ICD-10-CM

## 2024-09-04 DIAGNOSIS — E78.2 MIXED HYPERLIPIDEMIA: ICD-10-CM

## 2024-09-04 DIAGNOSIS — F41.9 ANXIETY: ICD-10-CM

## 2024-09-04 LAB
ALBUMIN SERPL BCG-MCNC: 4.1 G/DL (ref 3.5–5)
ALP SERPL-CCNC: 74 U/L (ref 34–104)
ALT SERPL W P-5'-P-CCNC: 20 U/L (ref 7–52)
ANION GAP SERPL CALCULATED.3IONS-SCNC: 10 MMOL/L (ref 4–13)
AST SERPL W P-5'-P-CCNC: 18 U/L (ref 13–39)
BASOPHILS # BLD AUTO: 0.04 THOUSANDS/ÂΜL (ref 0–0.1)
BASOPHILS NFR BLD AUTO: 1 % (ref 0–1)
BILIRUB SERPL-MCNC: 0.63 MG/DL (ref 0.2–1)
BUN SERPL-MCNC: 10 MG/DL (ref 5–25)
CALCIUM SERPL-MCNC: 9 MG/DL (ref 8.4–10.2)
CHLORIDE SERPL-SCNC: 105 MMOL/L (ref 96–108)
CHOLEST SERPL-MCNC: 166 MG/DL
CO2 SERPL-SCNC: 27 MMOL/L (ref 21–32)
CREAT SERPL-MCNC: 0.76 MG/DL (ref 0.6–1.3)
EOSINOPHIL # BLD AUTO: 0.18 THOUSAND/ÂΜL (ref 0–0.61)
EOSINOPHIL NFR BLD AUTO: 2 % (ref 0–6)
ERYTHROCYTE [DISTWIDTH] IN BLOOD BY AUTOMATED COUNT: 12.1 % (ref 11.6–15.1)
EST. AVERAGE GLUCOSE BLD GHB EST-MCNC: 131 MG/DL
GFR SERPL CREATININE-BSD FRML MDRD: 85 ML/MIN/1.73SQ M
GLUCOSE P FAST SERPL-MCNC: 76 MG/DL (ref 65–99)
HBA1C MFR BLD: 6.2 %
HCT VFR BLD AUTO: 44.9 % (ref 34.8–46.1)
HDLC SERPL-MCNC: 46 MG/DL
HGB BLD-MCNC: 14.4 G/DL (ref 11.5–15.4)
IMM GRANULOCYTES # BLD AUTO: 0.02 THOUSAND/UL (ref 0–0.2)
IMM GRANULOCYTES NFR BLD AUTO: 0 % (ref 0–2)
LDLC SERPL CALC-MCNC: 98 MG/DL (ref 0–100)
LYMPHOCYTES # BLD AUTO: 2.45 THOUSANDS/ÂΜL (ref 0.6–4.47)
LYMPHOCYTES NFR BLD AUTO: 31 % (ref 14–44)
MCH RBC QN AUTO: 29.4 PG (ref 26.8–34.3)
MCHC RBC AUTO-ENTMCNC: 32.1 G/DL (ref 31.4–37.4)
MCV RBC AUTO: 92 FL (ref 82–98)
MONOCYTES # BLD AUTO: 0.54 THOUSAND/ÂΜL (ref 0.17–1.22)
MONOCYTES NFR BLD AUTO: 7 % (ref 4–12)
NEUTROPHILS # BLD AUTO: 4.71 THOUSANDS/ÂΜL (ref 1.85–7.62)
NEUTS SEG NFR BLD AUTO: 59 % (ref 43–75)
NONHDLC SERPL-MCNC: 120 MG/DL
NRBC BLD AUTO-RTO: 0 /100 WBCS
PLATELET # BLD AUTO: 301 THOUSANDS/UL (ref 149–390)
PMV BLD AUTO: 10 FL (ref 8.9–12.7)
POTASSIUM SERPL-SCNC: 3.9 MMOL/L (ref 3.5–5.3)
PROT SERPL-MCNC: 6.7 G/DL (ref 6.4–8.4)
RBC # BLD AUTO: 4.89 MILLION/UL (ref 3.81–5.12)
SODIUM SERPL-SCNC: 142 MMOL/L (ref 135–147)
TRIGL SERPL-MCNC: 109 MG/DL
TSH SERPL DL<=0.05 MIU/L-ACNC: 1.86 UIU/ML (ref 0.45–4.5)
WBC # BLD AUTO: 7.94 THOUSAND/UL (ref 4.31–10.16)

## 2024-09-04 PROCEDURE — 83036 HEMOGLOBIN GLYCOSYLATED A1C: CPT

## 2024-09-04 PROCEDURE — 80053 COMPREHEN METABOLIC PANEL: CPT

## 2024-09-04 PROCEDURE — 80061 LIPID PANEL: CPT

## 2024-09-04 PROCEDURE — 36415 COLL VENOUS BLD VENIPUNCTURE: CPT

## 2024-09-04 PROCEDURE — 84443 ASSAY THYROID STIM HORMONE: CPT

## 2024-09-04 PROCEDURE — 85025 COMPLETE CBC W/AUTO DIFF WBC: CPT

## 2024-09-22 DIAGNOSIS — F33.0 MILD EPISODE OF RECURRENT MAJOR DEPRESSIVE DISORDER (HCC): ICD-10-CM

## 2024-09-22 DIAGNOSIS — F41.9 ANXIETY: ICD-10-CM

## 2024-09-22 RX ORDER — CITALOPRAM HYDROBROMIDE 20 MG/1
20 TABLET ORAL DAILY
Qty: 90 TABLET | Refills: 1 | Status: SHIPPED | OUTPATIENT
Start: 2024-09-22

## 2024-10-20 DIAGNOSIS — E78.2 MIXED HYPERLIPIDEMIA: ICD-10-CM

## 2024-10-21 RX ORDER — ATORVASTATIN CALCIUM 10 MG/1
10 TABLET, FILM COATED ORAL DAILY
Qty: 90 TABLET | Refills: 1 | Status: SHIPPED | OUTPATIENT
Start: 2024-10-21

## 2024-11-11 ENCOUNTER — TELEPHONE (OUTPATIENT)
Age: 59
End: 2024-11-11

## 2024-11-11 DIAGNOSIS — M79.89 PAIN AND SWELLING OF TOE OF RIGHT FOOT: Primary | ICD-10-CM

## 2024-11-11 DIAGNOSIS — M79.674 PAIN AND SWELLING OF TOE OF RIGHT FOOT: Primary | ICD-10-CM

## 2024-11-11 NOTE — TELEPHONE ENCOUNTER
Patient scheduled appointment for first available Wednesday of 11/20/24.    She was playing soccer with her grandson and injured her R middel toe; onset 2 wks.    Toe is bruised, swollen, and painful.    Patient request order for xray.    Please call back patient to update on order and if 11/20/24 appointment is needed.

## 2024-11-11 NOTE — TELEPHONE ENCOUNTER
"Called x2, \"the person your calling is unavailable\", unable to reach the patient or voicemail. Sent [employer] Manager[employer] message informing the patient.  "

## 2024-11-12 ENCOUNTER — APPOINTMENT (OUTPATIENT)
Dept: RADIOLOGY | Facility: CLINIC | Age: 59
End: 2024-11-12
Payer: COMMERCIAL

## 2024-11-12 DIAGNOSIS — M79.674 PAIN AND SWELLING OF TOE OF RIGHT FOOT: ICD-10-CM

## 2024-11-12 DIAGNOSIS — M79.89 PAIN AND SWELLING OF TOE OF RIGHT FOOT: ICD-10-CM

## 2024-11-12 PROCEDURE — 73660 X-RAY EXAM OF TOE(S): CPT

## 2024-11-13 NOTE — TELEPHONE ENCOUNTER
Heydi was just following up after her xray.  She was curious if results are in yet.  She was advised that once results are in her pcp will review.    She requested to be Called with result information.  You can send my chart as well but she is requesting a verbal call back.    Thank you

## 2024-11-15 ENCOUNTER — RESULTS FOLLOW-UP (OUTPATIENT)
Dept: FAMILY MEDICINE CLINIC | Facility: CLINIC | Age: 59
End: 2024-11-15

## 2024-11-15 DIAGNOSIS — S92.534A CLOSED NONDISPLACED FRACTURE OF DISTAL PHALANX OF LESSER TOE OF RIGHT FOOT, INITIAL ENCOUNTER: Primary | ICD-10-CM

## 2024-11-15 NOTE — TELEPHONE ENCOUNTER
----- Message from Dionne Solares DO sent at 11/15/2024 10:03 AM EST -----  Please let pt know her xray shows a fracture of her toe. It is not displaced, which is good. I am going to put in a referral to Podiatry for her -- while waiting to see them, she can stacy tape it to the next toe and wear good shoes for support

## 2025-02-05 ENCOUNTER — OFFICE VISIT (OUTPATIENT)
Dept: FAMILY MEDICINE CLINIC | Facility: CLINIC | Age: 60
End: 2025-02-05
Payer: COMMERCIAL

## 2025-02-05 VITALS
WEIGHT: 158 LBS | HEIGHT: 62 IN | SYSTOLIC BLOOD PRESSURE: 146 MMHG | DIASTOLIC BLOOD PRESSURE: 92 MMHG | HEART RATE: 78 BPM | TEMPERATURE: 96.5 F | OXYGEN SATURATION: 99 % | BODY MASS INDEX: 29.08 KG/M2

## 2025-02-05 DIAGNOSIS — F41.9 ANXIETY: ICD-10-CM

## 2025-02-05 DIAGNOSIS — Q21.0 VSD (VENTRICULAR SEPTAL DEFECT): ICD-10-CM

## 2025-02-05 DIAGNOSIS — H93.8X3 SENSATION OF FULLNESS IN BOTH EARS: ICD-10-CM

## 2025-02-05 DIAGNOSIS — R09.82 POST-NASAL DRIP: ICD-10-CM

## 2025-02-05 DIAGNOSIS — R03.0 ELEVATED BP WITHOUT DIAGNOSIS OF HYPERTENSION: ICD-10-CM

## 2025-02-05 DIAGNOSIS — F33.0 MILD EPISODE OF RECURRENT MAJOR DEPRESSIVE DISORDER (HCC): Primary | ICD-10-CM

## 2025-02-05 PROCEDURE — 99214 OFFICE O/P EST MOD 30 MIN: CPT | Performed by: FAMILY MEDICINE

## 2025-02-05 RX ORDER — CITALOPRAM HYDROBROMIDE 20 MG/1
20 TABLET ORAL DAILY
Start: 2025-02-05 | End: 2025-02-06 | Stop reason: SDUPTHER

## 2025-02-05 RX ORDER — FLUTICASONE PROPIONATE 50 MCG
2 SPRAY, SUSPENSION (ML) NASAL DAILY
Qty: 16 G | Refills: 1 | Status: SHIPPED | OUTPATIENT
Start: 2025-02-05

## 2025-02-05 NOTE — ASSESSMENT & PLAN NOTE
See Depression  Orders:  •  citalopram (CeleXA) 20 mg tablet; Take 1 tablet (20 mg total) by mouth daily

## 2025-02-05 NOTE — ASSESSMENT & PLAN NOTE
Currently poorly controlled off medication. Encouraged to restart Celexa -- can take 1/2 tablet daily x1-2 weeks and then increase back to 20 mg daily.   Orders:  •  citalopram (CeleXA) 20 mg tablet; Take 1 tablet (20 mg total) by mouth daily

## 2025-02-05 NOTE — PROGRESS NOTES
"Name: Heydi Buckner      : 1965      MRN: [id number]  Encounter Provider: Dionne Solares DO  Encounter Date: 2025   Encounter department: [facility]  :  Assessment & Plan  Mild episode of recurrent major depressive disorder (HCC)  Currently poorly controlled off medication. Encouraged to restart Celexa -- can take 1/2 tablet daily x1-2 weeks and then increase back to 20 mg daily.   Orders:  •  citalopram (CeleXA) 20 mg tablet; Take 1 tablet (20 mg total) by mouth daily    Anxiety  See Depression  Orders:  •  citalopram (CeleXA) 20 mg tablet; Take 1 tablet (20 mg total) by mouth daily    Elevated BP without diagnosis of hypertension  Borderline in office and at home. Unclear if related to lifestyle, elevated agitation in setting of stopping Celexa, or true HTN. Discussed trial of increased hydration, cutting back on salt/caffeine in addition to restarting Celexa as above. Continue home monitoring and f/u in 3-4 weeks with log/home cuff for re-evaluation. If persistently above goal, consider anti-hypertensive regimen        Post-nasal drip  No evidence of bacterial focus on exam, likely viral vs allergic in nature. Encouraged trial of Flonase -- to call if symptoms persist/worsen.   Orders:  •  fluticasone (FLONASE) 50 mcg/act nasal spray; 2 sprays into each nostril daily    Sensation of fullness in both ears         VSD (ventricular septal defect)  Chronic, known -- murmur on exam. Last ECHO in , has repeat ordered to monitor               History of Present Illness   HPI    Pt presents due to multiple concerns as detailed below     Her ears have been feeling \"full\" (L>R) and last night she had post-nasal drip as well   Has some throat irritation   Her  has a sinus infection, so she wanted to check this out     In December, slowly weaned off her Celexa; however, she feels like she would like to restart it as she notes worsening agitation/anger, tearful     Home BPs " "have been 140-160s/80-90s; doesn't drink much water, has at least 3 cups of black tea per day, doesn't add a lot of salt but enjoys salty snacks     Review of Systems   Constitutional:  Positive for fatigue. Negative for fever.   HENT:  Positive for postnasal drip. Negative for congestion, ear pain ((+) clogged), rhinorrhea and sore throat (yesterday -- did salt water rinses, cough drops).    Respiratory:  Positive for cough (a little bit). Negative for chest tightness and shortness of breath.    Cardiovascular:  Negative for chest pain, palpitations and leg swelling.       Objective   /92   Pulse 78   Temp (!) 96.5 °F (35.8 °C)   Ht 5' 2\" (1.575 m)   Wt 71.7 kg (158 lb)   SpO2 99%   BMI 28.90 kg/m²      Physical Exam  Vitals and nursing note reviewed.   Constitutional:       General: She is not in acute distress.     Appearance: She is well-developed.   HENT:      Head: Normocephalic and atraumatic.      Right Ear: Tympanic membrane, ear canal and external ear normal.      Left Ear: Tympanic membrane, ear canal and external ear normal.      Nose: Nose normal. No rhinorrhea.      Mouth/Throat:      Mouth: Mucous membranes are moist.      Pharynx: No oropharyngeal exudate or posterior oropharyngeal erythema.   Eyes:      Conjunctiva/sclera: Conjunctivae normal.   Cardiovascular:      Rate and Rhythm: Normal rate and regular rhythm.      Heart sounds: Murmur heard.   Pulmonary:      [place]: Pulmonary effort is normal. No respiratory distress.      Breath sounds: Normal breath sounds.   Musculoskeletal:      Right lower leg: No edema.      Left lower leg: No edema.   Lymphadenopathy:      Cervical: No cervical adenopathy.   Skin:     General: Skin is warm and dry.   Neurological:      Mental Status: She is alert.      Comments: Grossly intact   Psychiatric:         Mood and Affect: Mood normal.         "

## 2025-02-06 ENCOUNTER — TELEPHONE (OUTPATIENT)
Dept: FAMILY MEDICINE CLINIC | Facility: CLINIC | Age: 60
End: 2025-02-06

## 2025-02-06 DIAGNOSIS — F41.9 ANXIETY: ICD-10-CM

## 2025-02-06 DIAGNOSIS — F33.0 MILD EPISODE OF RECURRENT MAJOR DEPRESSIVE DISORDER (HCC): ICD-10-CM

## 2025-02-06 RX ORDER — CITALOPRAM HYDROBROMIDE 20 MG/1
20 TABLET ORAL DAILY
Qty: 90 TABLET | Refills: 1 | Status: SHIPPED | OUTPATIENT
Start: 2025-02-06

## 2025-02-26 ENCOUNTER — RA CDI HCC (OUTPATIENT)
Dept: OTHER | Facility: HOSPITAL | Age: 60
End: 2025-02-26

## 2025-02-26 NOTE — PROGRESS NOTES
HCC coding opportunities       Chart reviewed, no opportunity found: CHART REVIEWED, NO OPPORTUNITY FOUND        Patients Insurance        Commercial Insurance: [employer]

## 2025-02-27 DIAGNOSIS — R09.82 POST-NASAL DRIP: ICD-10-CM

## 2025-02-28 RX ORDER — FLUTICASONE PROPIONATE 50 MCG
SPRAY, SUSPENSION (ML) NASAL
Qty: 48 ML | Refills: 1 | Status: SHIPPED | OUTPATIENT
Start: 2025-02-28

## 2025-04-01 NOTE — PROGRESS NOTES
"Name: Heydi Buckner      : 1965      MRN: [id number]  Encounter Provider: Dionne Solares DO  Encounter Date: 2025   Encounter department: [facility]  :  Assessment & Plan  Mild episode of recurrent major depressive disorder (HCC)  Symptoms much improved since restarting Celexa -- continue.     Orders:    TSH, 3rd generation with Free T4 reflex; Future    Anxiety    Orders:    TSH, 3rd generation with Free T4 reflex; Future    Pre-diabetes    Orders:    CBC and differential; Future    Comprehensive metabolic panel; Future    Lipid panel; Future    Hemoglobin A1C; Future    Mixed hyperlipidemia    Orders:    CBC and differential; Future    Comprehensive metabolic panel; Future    Lipid panel; Future           History of Present Illness   HPI     Pt presents for medication follow up -- restarted Celexa   \"I am feeling much better\"   Home BPs improved as well, systolics over the past week mostly 120-140s    Would like labs ordered to update prior to annual in 2025       Review of Systems   Psychiatric/Behavioral:  Agitation: improved. Nervous/anxious: improved.        Objective   /76   Pulse 74   Temp 97.8 °F (36.6 °C)   Ht 5' 2\" (1.575 m)   Wt 70.3 kg (155 lb)   SpO2 97%   BMI 28.35 kg/m²      Physical Exam  Vitals and nursing note reviewed.   Constitutional:       General: She is not in acute distress.     Appearance: Normal appearance.   Pulmonary:      [place]: Pulmonary effort is normal. No respiratory distress.   Neurological:      Mental Status: She is alert.      Comments: Grossly intact   Psychiatric:         Mood and Affect: Mood normal.         "

## 2025-04-01 NOTE — ASSESSMENT & PLAN NOTE
Symptoms much improved since restarting Celexa -- continue.     Orders:    TSH, 3rd generation with Free T4 reflex; Future

## 2025-04-02 ENCOUNTER — OFFICE VISIT (OUTPATIENT)
Dept: FAMILY MEDICINE CLINIC | Facility: CLINIC | Age: 60
End: 2025-04-02
Payer: COMMERCIAL

## 2025-04-02 VITALS
TEMPERATURE: 97.8 F | WEIGHT: 155 LBS | HEART RATE: 74 BPM | HEIGHT: 62 IN | OXYGEN SATURATION: 97 % | BODY MASS INDEX: 28.52 KG/M2 | SYSTOLIC BLOOD PRESSURE: 126 MMHG | DIASTOLIC BLOOD PRESSURE: 76 MMHG

## 2025-04-02 DIAGNOSIS — R73.03 PRE-DIABETES: ICD-10-CM

## 2025-04-02 DIAGNOSIS — E78.2 MIXED HYPERLIPIDEMIA: ICD-10-CM

## 2025-04-02 DIAGNOSIS — F33.0 MILD EPISODE OF RECURRENT MAJOR DEPRESSIVE DISORDER (HCC): Primary | ICD-10-CM

## 2025-04-02 DIAGNOSIS — F41.9 ANXIETY: ICD-10-CM

## 2025-04-02 PROCEDURE — 99213 OFFICE O/P EST LOW 20 MIN: CPT | Performed by: FAMILY MEDICINE

## 2025-04-02 NOTE — ASSESSMENT & PLAN NOTE
Orders:    CBC and differential; Future    Comprehensive metabolic panel; Future    Lipid panel; Future    Hemoglobin A1C; Future

## 2025-04-15 DIAGNOSIS — E78.2 MIXED HYPERLIPIDEMIA: ICD-10-CM

## 2025-04-15 RX ORDER — ATORVASTATIN CALCIUM 10 MG/1
10 TABLET, FILM COATED ORAL DAILY
Qty: 90 TABLET | Refills: 1 | Status: SHIPPED | OUTPATIENT
Start: 2025-04-15

## 2025-08-01 DIAGNOSIS — F41.9 ANXIETY: ICD-10-CM

## 2025-08-01 DIAGNOSIS — F33.0 MILD EPISODE OF RECURRENT MAJOR DEPRESSIVE DISORDER (HCC): ICD-10-CM

## 2025-08-01 RX ORDER — CITALOPRAM HYDROBROMIDE 20 MG/1
20 TABLET ORAL DAILY
Qty: 90 TABLET | Refills: 1 | Status: SHIPPED | OUTPATIENT
Start: 2025-08-01